# Patient Record
Sex: FEMALE | Race: BLACK OR AFRICAN AMERICAN | NOT HISPANIC OR LATINO
[De-identification: names, ages, dates, MRNs, and addresses within clinical notes are randomized per-mention and may not be internally consistent; named-entity substitution may affect disease eponyms.]

---

## 2017-04-15 ENCOUNTER — APPOINTMENT (OUTPATIENT)
Dept: FAMILY MEDICINE | Facility: CLINIC | Age: 27
End: 2017-04-15

## 2017-08-28 ENCOUNTER — TRANSCRIPTION ENCOUNTER (OUTPATIENT)
Age: 27
End: 2017-08-28

## 2017-09-15 ENCOUNTER — RESULT REVIEW (OUTPATIENT)
Age: 27
End: 2017-09-15

## 2017-10-27 ENCOUNTER — EMERGENCY (EMERGENCY)
Facility: HOSPITAL | Age: 27
LOS: 1 days | Discharge: HOME | End: 2017-10-27
Attending: EMERGENCY MEDICINE | Admitting: EMERGENCY MEDICINE
Payer: COMMERCIAL

## 2017-10-27 ENCOUNTER — TRANSCRIPTION ENCOUNTER (OUTPATIENT)
Age: 27
End: 2017-10-27

## 2017-10-27 VITALS
HEIGHT: 66 IN | RESPIRATION RATE: 20 BRPM | WEIGHT: 235.89 LBS | SYSTOLIC BLOOD PRESSURE: 168 MMHG | DIASTOLIC BLOOD PRESSURE: 91 MMHG | TEMPERATURE: 98 F | OXYGEN SATURATION: 98 % | HEART RATE: 115 BPM

## 2017-10-27 PROCEDURE — 99282 EMERGENCY DEPT VISIT SF MDM: CPT

## 2017-10-27 PROCEDURE — 99283 EMERGENCY DEPT VISIT LOW MDM: CPT

## 2017-10-27 RX ORDER — CEPHALEXIN 500 MG
1 CAPSULE ORAL
Qty: 28 | Refills: 0
Start: 2017-10-27 | End: 2017-11-03

## 2017-10-27 RX ORDER — PHENAZOPYRIDINE HCL 100 MG
1 TABLET ORAL
Qty: 15 | Refills: 0
Start: 2017-10-27 | End: 2017-11-01

## 2017-10-27 NOTE — ED STATDOCS - MEDICAL DECISION MAKING DETAILS
Pt awaiting urine culture results from [facility]. Will start pt on Keflex, pyridium, and d/c for outpatient f/u.

## 2017-10-27 NOTE — ED STATDOCS - NS_ ATTENDINGSCRIBEDETAILS _ED_A_ED_FT
I, Rasheed Cantrell, performed the initial face to face bedside interview with this patient regarding history of present illness, review of symptoms and relevant past medical, social and family history.  I completed an independent physical examination.  I was the initial provider who evaluated this patient. I have signed out the follow up of any pending tests (i.e. labs, radiological studies) to the ACP.  I have communicated the patient’s plan of care and disposition with the ACP.  The history, relevant review of systems, past medical and surgical history, medical decision making, and physical examination was documented by the scribe in my presence and I attest to the accuracy of the documentation.

## 2017-10-27 NOTE — ED STATDOCS - OBJECTIVE STATEMENT
28 yo F presents to ED c/o subjective fever, body aches x 2-3 days and dysuria x 1 day. Pt was evaluated by [facility] today PTA for UTI like symptoms, had UA and culture done, and was sent to the ED to r/o pyelonephritis. Denies receiving [place] [facility] Taking Azo at home. Denies hx of pyelo. Pt had similar UTI in past. Denies recent abx use. Denies abd pain, back pain, nausea, vomiting, hematuria, cough, cold, sore throat. NKDA.

## 2017-10-30 ENCOUNTER — RESULT CHARGE (OUTPATIENT)
Age: 27
End: 2017-10-30

## 2017-10-30 ENCOUNTER — APPOINTMENT (OUTPATIENT)
Dept: FAMILY MEDICINE | Facility: CLINIC | Age: 27
End: 2017-10-30
Payer: MEDICAID

## 2017-10-30 ENCOUNTER — FORM ENCOUNTER (OUTPATIENT)
Age: 27
End: 2017-10-30

## 2017-10-30 ENCOUNTER — LABORATORY RESULT (OUTPATIENT)
Age: 27
End: 2017-10-30

## 2017-10-30 VITALS
BODY MASS INDEX: 37.12 KG/M2 | TEMPERATURE: 98.8 F | HEART RATE: 106 BPM | OXYGEN SATURATION: 98 % | SYSTOLIC BLOOD PRESSURE: 115 MMHG | HEIGHT: 66 IN | WEIGHT: 231 LBS | DIASTOLIC BLOOD PRESSURE: 82 MMHG

## 2017-10-30 LAB
BILIRUB UR QL STRIP: NEGATIVE
GLUCOSE UR-MCNC: NORMAL
HCG UR QL: 2 EU/DL
HCG UR QL: NEGATIVE
HGB UR QL STRIP.AUTO: NORMAL
KETONES UR-MCNC: NEGATIVE
LEUKOCYTE ESTERASE UR QL STRIP: NORMAL
NITRITE UR QL STRIP: POSITIVE
PH UR STRIP: 6.5
PROT UR STRIP-MCNC: NORMAL
QUALITY CONTROL: YES
SP GR UR STRIP: 1.01

## 2017-10-30 PROCEDURE — 99215 OFFICE O/P EST HI 40 MIN: CPT | Mod: 25

## 2017-10-30 PROCEDURE — 81025 URINE PREGNANCY TEST: CPT

## 2017-10-30 PROCEDURE — 36415 COLL VENOUS BLD VENIPUNCTURE: CPT

## 2017-10-30 PROCEDURE — 81003 URINALYSIS AUTO W/O SCOPE: CPT | Mod: QW

## 2017-10-31 ENCOUNTER — OUTPATIENT (OUTPATIENT)
Dept: OUTPATIENT SERVICES | Facility: HOSPITAL | Age: 27
LOS: 1 days | End: 2017-10-31
Payer: COMMERCIAL

## 2017-10-31 ENCOUNTER — APPOINTMENT (OUTPATIENT)
Dept: ULTRASOUND IMAGING | Facility: CLINIC | Age: 27
End: 2017-10-31
Payer: COMMERCIAL

## 2017-10-31 DIAGNOSIS — N39.0 URINARY TRACT INFECTION, SITE NOT SPECIFIED: ICD-10-CM

## 2017-10-31 DIAGNOSIS — R50.9 FEVER, UNSPECIFIED: ICD-10-CM

## 2017-10-31 DIAGNOSIS — R10.2 PELVIC AND PERINEAL PAIN: ICD-10-CM

## 2017-10-31 PROCEDURE — 76770 US EXAM ABDO BACK WALL COMP: CPT | Mod: 26

## 2017-10-31 PROCEDURE — 76830 TRANSVAGINAL US NON-OB: CPT | Mod: 26

## 2017-10-31 PROCEDURE — 76856 US EXAM PELVIC COMPLETE: CPT | Mod: 26

## 2017-10-31 PROCEDURE — 76856 US EXAM PELVIC COMPLETE: CPT

## 2017-10-31 PROCEDURE — 76830 TRANSVAGINAL US NON-OB: CPT

## 2017-10-31 PROCEDURE — 76770 US EXAM ABDO BACK WALL COMP: CPT

## 2017-11-01 LAB
25(OH)D3 SERPL-MCNC: 23.5 NG/ML
ALBUMIN SERPL ELPH-MCNC: 3.9 G/DL
ALP BLD-CCNC: 79 U/L
ALT SERPL-CCNC: 34 U/L
ANION GAP SERPL CALC-SCNC: 13 MMOL/L
APPEARANCE: CLEAR
AST SERPL-CCNC: 26 U/L
BACTERIA UR CULT: NORMAL
BACTERIA: ABNORMAL
BASOPHILS # BLD AUTO: 0 K/UL
BASOPHILS NFR BLD AUTO: 0 %
BILIRUB SERPL-MCNC: 0.2 MG/DL
BILIRUBIN URINE: ABNORMAL
BLOOD URINE: NEGATIVE
BUN SERPL-MCNC: 9 MG/DL
C TRACH RRNA SPEC QL NAA+PROBE: NOT DETECTED
CALCIUM SERPL-MCNC: 8.9 MG/DL
CHLORIDE SERPL-SCNC: 100 MMOL/L
CHOLEST SERPL-MCNC: 205 MG/DL
CHOLEST/HDLC SERPL: 5.9 RATIO
CO2 SERPL-SCNC: 25 MMOL/L
COLOR: ABNORMAL
CREAT SERPL-MCNC: 1 MG/DL
EOSINOPHIL # BLD AUTO: 0.12 K/UL
EOSINOPHIL NFR BLD AUTO: 2.7 %
ERYTHROCYTE [SEDIMENTATION RATE] IN BLOOD BY WESTERGREN METHOD: 37 MM/HR
GLUCOSE QUALITATIVE U: NEGATIVE MG/DL
GLUCOSE SERPL-MCNC: 87 MG/DL
HBA1C MFR BLD HPLC: 5.8 %
HCT VFR BLD CALC: 41.5 %
HDLC SERPL-MCNC: 35 MG/DL
HGB BLD-MCNC: 13.3 G/DL
HIV1+2 AB SPEC QL IA.RAPID: NONREACTIVE
HYALINE CASTS: 0 /LPF
KETONES URINE: NEGATIVE
LDLC SERPL CALC-MCNC: 136 MG/DL
LEUKOCYTE ESTERASE URINE: ABNORMAL
LYMPHOCYTES # BLD AUTO: 1.04 K/UL
LYMPHOCYTES NFR BLD AUTO: 23 %
MAN DIFF?: NORMAL
MCHC RBC-ENTMCNC: 25.1 PG
MCHC RBC-ENTMCNC: 32 GM/DL
MCV RBC AUTO: 78.4 FL
MICROSCOPIC-UA: NORMAL
MONOCYTES # BLD AUTO: 0.6 K/UL
MONOCYTES NFR BLD AUTO: 13.3 %
N GONORRHOEA RRNA SPEC QL NAA+PROBE: NOT DETECTED
NEUTROPHILS # BLD AUTO: 2.75 K/UL
NEUTROPHILS NFR BLD AUTO: 61 %
NITRITE URINE: POSITIVE
PH URINE: 6.5
PLATELET # BLD AUTO: 210 K/UL
POTASSIUM SERPL-SCNC: 4.6 MMOL/L
PROT SERPL-MCNC: 7.7 G/DL
PROTEIN URINE: 30 MG/DL
RBC # BLD: 5.29 M/UL
RBC # FLD: 14.4 %
RED BLOOD CELLS URINE: 4 /HPF
SODIUM SERPL-SCNC: 138 MMOL/L
SOURCE AMPLIFICATION: NORMAL
SPECIFIC GRAVITY URINE: 1.01
SQUAMOUS EPITHELIAL CELLS: 20 /HPF
T4 FREE SERPL-MCNC: 1.4 NG/DL
TRIGL SERPL-MCNC: 168 MG/DL
TSH SERPL-ACNC: 0.85 UIU/ML
URINE COMMENTS: NORMAL
UROBILINOGEN URINE: 1 MG/DL
WBC # FLD AUTO: 4.51 K/UL
WHITE BLOOD CELLS URINE: 15 /HPF

## 2017-11-08 ENCOUNTER — TRANSCRIPTION ENCOUNTER (OUTPATIENT)
Age: 27
End: 2017-11-08

## 2017-11-13 ENCOUNTER — APPOINTMENT (OUTPATIENT)
Dept: FAMILY MEDICINE | Facility: CLINIC | Age: 27
End: 2017-11-13

## 2018-01-26 ENCOUNTER — TRANSCRIPTION ENCOUNTER (OUTPATIENT)
Age: 28
End: 2018-01-26

## 2018-10-16 ENCOUNTER — RESULT REVIEW (OUTPATIENT)
Age: 28
End: 2018-10-16

## 2019-04-10 ENCOUNTER — APPOINTMENT (OUTPATIENT)
Dept: INTERNAL MEDICINE | Facility: CLINIC | Age: 29
End: 2019-04-10

## 2019-06-21 ENCOUNTER — TRANSCRIPTION ENCOUNTER (OUTPATIENT)
Age: 29
End: 2019-06-21

## 2019-08-20 ENCOUNTER — TRANSCRIPTION ENCOUNTER (OUTPATIENT)
Age: 29
End: 2019-08-20

## 2019-08-26 ENCOUNTER — NON-APPOINTMENT (OUTPATIENT)
Age: 29
End: 2019-08-26

## 2019-08-26 ENCOUNTER — APPOINTMENT (OUTPATIENT)
Dept: INTERNAL MEDICINE | Facility: CLINIC | Age: 29
End: 2019-08-26
Payer: COMMERCIAL

## 2019-08-26 VITALS
DIASTOLIC BLOOD PRESSURE: 72 MMHG | TEMPERATURE: 98.3 F | RESPIRATION RATE: 14 BRPM | WEIGHT: 223 LBS | OXYGEN SATURATION: 98 % | SYSTOLIC BLOOD PRESSURE: 112 MMHG | BODY MASS INDEX: 35.84 KG/M2 | HEART RATE: 115 BPM | HEIGHT: 66 IN

## 2019-08-26 DIAGNOSIS — Z87.898 PERSONAL HISTORY OF OTHER SPECIFIED CONDITIONS: ICD-10-CM

## 2019-08-26 DIAGNOSIS — Z00.00 ENCOUNTER FOR GENERAL ADULT MEDICAL EXAMINATION W/OUT ABNORMAL FINDINGS: ICD-10-CM

## 2019-08-26 DIAGNOSIS — J06.9 ACUTE UPPER RESPIRATORY INFECTION, UNSPECIFIED: ICD-10-CM

## 2019-08-26 DIAGNOSIS — F32.9 MAJOR DEPRESSIVE DISORDER, SINGLE EPISODE, UNSPECIFIED: ICD-10-CM

## 2019-08-26 LAB
FLUAV SPEC QL CULT: NORMAL
FLUBV AG SPEC QL IA: NORMAL
HCG UR QL: NEGATIVE
QUALITY CONTROL: YES

## 2019-08-26 PROCEDURE — 93000 ELECTROCARDIOGRAM COMPLETE: CPT

## 2019-08-26 PROCEDURE — 36415 COLL VENOUS BLD VENIPUNCTURE: CPT

## 2019-08-26 PROCEDURE — 81025 URINE PREGNANCY TEST: CPT

## 2019-08-26 PROCEDURE — 96127 BRIEF EMOTIONAL/BEHAV ASSMT: CPT

## 2019-08-26 PROCEDURE — 99215 OFFICE O/P EST HI 40 MIN: CPT | Mod: 25

## 2019-08-26 PROCEDURE — 87804 INFLUENZA ASSAY W/OPTIC: CPT | Mod: QW

## 2019-08-26 RX ORDER — CEPHALEXIN 500 MG/1
500 CAPSULE ORAL
Qty: 28 | Refills: 0 | Status: DISCONTINUED | COMMUNITY
Start: 2017-10-27 | End: 2019-08-26

## 2019-08-26 RX ORDER — LEVOFLOXACIN 500 MG/1
500 TABLET, FILM COATED ORAL DAILY
Qty: 7 | Refills: 0 | Status: DISCONTINUED | COMMUNITY
Start: 2017-10-30 | End: 2019-08-26

## 2019-08-26 RX ORDER — PHENAZOPYRIDINE HYDROCHLORIDE 100 MG/1
100 TABLET ORAL
Qty: 15 | Refills: 0 | Status: DISCONTINUED | COMMUNITY
Start: 2017-10-27 | End: 2019-08-26

## 2019-08-26 NOTE — ASSESSMENT
[FreeTextEntry1] : \par Acute pharyngitis/otitis media:\par -rapid flu test negative today\par -will tx with amoxicillin 875mg PO BID x 10 days\par -I advised rest and fluids\par -note given for work\par -she is to notify office if sx persist or worsen\par \par Depression\par -PHQ 9 score 13\par -Tx options discussed\par -NO SI/HI\par -Will start lexapro 10mg Po daily  (R/B/A/side effects discussed)\par -will refer to [facility]\par -she is to f/u in 3 weeks\par -she is to notify office if sx persist or worsen\par \par Pre-DM/Obesity:\par -will check HgA1c/labs\par \par Migraine headaches:\par -no sx currently\par -uses excedrin prn\par \par Thyroid nodule/thyromegaly:\par -previously seen by Endocrine but did not follow up\par -will check TFTs\par -I previously ordered f/u thyroid US in 2017 butr she did nto go for study-she states she had a lapse in insurance coverage so she did not follow up\par -will order thyroid US\par \par Obesity/Excessive snoring:\par -she was referred to pulmonary for sleep study to r/o VELMA in 2017 but never went\par -she states she still snores excessively and is interested in sleep study-will refer to pulmonary\par \par Dyslipidemia:\par -will check fasting lipids\par \par Vitamin D deficiency:\par -will check vitamin D 25-OH\par \par Tachycardia:\par -EKG today sinus tach at 107, no specific t wave abnormalities\par -tachycardia could be due to current illness vs anxiety vs deconditioning vs thyroid dx\par -she denies chest pain, sob, palpitations\par -will refer to cardiology\par \par HCM:\par \par CPE 2016\par \par EKG 2019\par \par Flu shot: advised-she declined 10/30/2017-advised in the fall\par \par Tdap:  \par \par Hepatitis B vaccine:I advised vaccination previously\par \par HIV testing offered: She consented to testing 2019\par \par HPV vaccine:  pt has refused in past\par \par GYN/PAP:  10/2018\par \par Depression screenin2019 PHQ 9 score 13\par \par F/U 3 weeks.  Labs drawn in office today

## 2019-08-26 NOTE — PHYSICAL EXAM
[No Acute Distress] : no acute distress [Well-Appearing] : well-appearing [Normal Voice/Communication] : normal voice/communication [PERRL] : pupils equal round and reactive to light [Normal Sclera/Conjunctiva] : normal sclera/conjunctiva [Normal Outer Ear/Nose] : the outer ears and nose were normal in appearance [Normal Nasal Mucosa] : the nasal mucosa was normal [No JVD] : no jugular venous distention [No Lymphadenopathy] : no lymphadenopathy [Supple] : supple [No Respiratory Distress] : no respiratory distress  [No Accessory Muscle Use] : no accessory muscle use [Clear to Auscultation] : lungs were clear to auscultation bilaterally [Regular Rhythm] : with a regular rhythm [No Murmur] : no murmur heard [No Carotid Bruits] : no carotid bruits [Normal S1, S2] : normal S1 and S2 [No Extremity Clubbing/Cyanosis] : no extremity clubbing/cyanosis [No Edema] : there was no peripheral edema [Non Tender] : non-tender [Soft] : abdomen soft [Non-distended] : non-distended [No Masses] : no abdominal mass palpated [No HSM] : no HSM [Normal Bowel Sounds] : normal bowel sounds [No Spinal Tenderness] : no spinal tenderness [No Joint Swelling] : no joint swelling [No Rash] : no rash [No Skin Lesions] : no skin lesions [No Focal Deficits] : no focal deficits [Normal Affect] : the affect was normal [Normal Mood] : the mood was normal [Alert and Oriented x3] : oriented to person, place, and time [Normal Insight/Judgement] : insight and judgment were intact [de-identified] : pharynx with mild erythema, no exudate, left TM bulging and erythematous, right TM intacct, B/L ear canals normal [de-identified] : Thyroid appears enlarged B/L, no discreet njodules [de-identified] : tachycardic at 110

## 2019-08-26 NOTE — HEALTH RISK ASSESSMENT
[Patient reported PAP Smear was normal] : Patient reported PAP Smear was normal [No] : In the past 12 months have you used drugs other than those required for medical reasons? No [] : No [[id number]] : 13 [PapSmearDate] : 10/18

## 2019-08-26 NOTE — HISTORY OF PRESENT ILLNESS
[de-identified] : Here today with cold symptoms.  She states she has sore throat, runny nose, chills, low grade temperature since yesterday.  No sick contact.  No recent foreign travel.  She denies cough.  She admits to sneezing. She states she did feel left ear pop and has a little left ear discomfort.  She has not taken anything for her sx.  She states she is feeling worse today\par \par She states a few people at work and her mother noted lump in her neck and she was concerned about her thyroid.  She has hx of thyroid nodule.  She was supposed to repeat thyroid imaging ordered at last visit but she did not go.  She states that the right side of neck feels puffier than the other. She denies difficulty swallowing food\par \par She also reports for last 3 months she has been feeling depressed.  She states she also feels anxious and sometimes has a hard time sleeping.  She admits to sometimes feeling overwhelmed and states sometimes she just has sadness and wants to cry.  She denies SI/HI.  She states her mother has also noticed this and advised her to follow up with her doctor.  She is interested in treatment.  She has never been on medication for anxiety or depression

## 2019-08-26 NOTE — REVIEW OF SYSTEMS
[Recent Change In Weight] : ~T recent weight change [Insomnia] : insomnia [Anxiety] : anxiety [Depression] : depression [Negative] : Neurological [Fever] : no fever [Chills] : no chills [Fatigue] : no fatigue [Chest Pain] : no chest pain [Palpitations] : no palpitations [Lower Ext Edema] : no lower extremity edema [Shortness Of Breath] : no shortness of breath [Wheezing] : no wheezing [Cough] : no cough [Dyspnea on Exertion] : no dyspnea on exertion [Abdominal Pain] : no abdominal pain [Nausea] : no nausea [Diarrhea] : diarrhea [Vomiting] : no vomiting [Suicidal] : not suicidal [Swollen Glands] : no swollen glands [FreeTextEntry2] : lost 8 lbs [FreeTextEntry4] : see HPI [de-identified] : see HPI

## 2019-08-30 ENCOUNTER — RESULT REVIEW (OUTPATIENT)
Age: 29
End: 2019-08-30

## 2019-08-30 LAB
25(OH)D3 SERPL-MCNC: 19.9 NG/ML
ALBUMIN SERPL ELPH-MCNC: 4.2 G/DL
ALP BLD-CCNC: 87 U/L
ALT SERPL-CCNC: 15 U/L
ANION GAP SERPL CALC-SCNC: 12 MMOL/L
APPEARANCE: ABNORMAL
AST SERPL-CCNC: 16 U/L
BACTERIA: ABNORMAL
BASOPHILS # BLD AUTO: 0.06 K/UL
BASOPHILS NFR BLD AUTO: 0.7 %
BILIRUB SERPL-MCNC: 0.3 MG/DL
BILIRUBIN URINE: NEGATIVE
BLOOD URINE: NORMAL
BUN SERPL-MCNC: 8 MG/DL
CALCIUM SERPL-MCNC: 9.4 MG/DL
CHLORIDE SERPL-SCNC: 104 MMOL/L
CHOLEST SERPL-MCNC: 218 MG/DL
CHOLEST/HDLC SERPL: 4.7 RATIO
CO2 SERPL-SCNC: 23 MMOL/L
COLOR: YELLOW
CREAT SERPL-MCNC: 1.06 MG/DL
EOSINOPHIL # BLD AUTO: 0.47 K/UL
EOSINOPHIL NFR BLD AUTO: 5.4 %
ESTIMATED AVERAGE GLUCOSE: 117 MG/DL
GLUCOSE QUALITATIVE U: NEGATIVE
GLUCOSE SERPL-MCNC: 89 MG/DL
HBA1C MFR BLD HPLC: 5.7 %
HCT VFR BLD CALC: 46.5 %
HDLC SERPL-MCNC: 46 MG/DL
HGB BLD-MCNC: 14.1 G/DL
HIV1+2 AB SPEC QL IA.RAPID: NONREACTIVE
HYALINE CASTS: 3 /LPF
IMM GRANULOCYTES NFR BLD AUTO: 0.2 %
KETONES URINE: NEGATIVE
LDLC SERPL CALC-MCNC: 149 MG/DL
LEUKOCYTE ESTERASE URINE: NEGATIVE
LYMPHOCYTES # BLD AUTO: 1.12 K/UL
LYMPHOCYTES NFR BLD AUTO: 13 %
MAN DIFF?: NORMAL
MCHC RBC-ENTMCNC: 25.9 PG
MCHC RBC-ENTMCNC: 30.3 GM/DL
MCV RBC AUTO: 85.3 FL
MICROSCOPIC-UA: NORMAL
MONOCYTES # BLD AUTO: 0.92 K/UL
MONOCYTES NFR BLD AUTO: 10.7 %
NEUTROPHILS # BLD AUTO: 6.04 K/UL
NEUTROPHILS NFR BLD AUTO: 70 %
NITRITE URINE: NEGATIVE
PH URINE: 6.5
PLATELET # BLD AUTO: 243 K/UL
POTASSIUM SERPL-SCNC: 4.5 MMOL/L
PROT SERPL-MCNC: 7.5 G/DL
PROTEIN URINE: ABNORMAL
RBC # BLD: 5.45 M/UL
RBC # FLD: 13.9 %
RED BLOOD CELLS URINE: 2 /HPF
SODIUM SERPL-SCNC: 139 MMOL/L
SPECIFIC GRAVITY URINE: 1.03
SQUAMOUS EPITHELIAL CELLS: >27 /HPF
T4 FREE SERPL-MCNC: 1.2 NG/DL
TRIGL SERPL-MCNC: 113 MG/DL
TSH SERPL-ACNC: 0.74 UIU/ML
UROBILINOGEN URINE: ABNORMAL
WBC # FLD AUTO: 8.63 K/UL
WHITE BLOOD CELLS URINE: 4 /HPF

## 2019-09-09 ENCOUNTER — APPOINTMENT (OUTPATIENT)
Dept: FAMILY MEDICINE | Facility: CLINIC | Age: 29
End: 2019-09-09

## 2019-09-10 ENCOUNTER — FORM ENCOUNTER (OUTPATIENT)
Age: 29
End: 2019-09-10

## 2019-09-11 ENCOUNTER — OUTPATIENT (OUTPATIENT)
Dept: OUTPATIENT SERVICES | Facility: HOSPITAL | Age: 29
LOS: 1 days | End: 2019-09-11
Payer: COMMERCIAL

## 2019-09-11 ENCOUNTER — APPOINTMENT (OUTPATIENT)
Dept: ULTRASOUND IMAGING | Facility: CLINIC | Age: 29
End: 2019-09-11
Payer: COMMERCIAL

## 2019-09-11 DIAGNOSIS — E04.1 NONTOXIC SINGLE THYROID NODULE: ICD-10-CM

## 2019-09-11 PROCEDURE — 76536 US EXAM OF HEAD AND NECK: CPT

## 2019-09-11 PROCEDURE — 76536 US EXAM OF HEAD AND NECK: CPT | Mod: 26

## 2019-09-17 ENCOUNTER — APPOINTMENT (OUTPATIENT)
Dept: INTERNAL MEDICINE | Facility: CLINIC | Age: 29
End: 2019-09-17
Payer: COMMERCIAL

## 2019-09-17 ENCOUNTER — RESULT REVIEW (OUTPATIENT)
Age: 29
End: 2019-09-17

## 2019-09-17 VITALS
TEMPERATURE: 97.7 F | SYSTOLIC BLOOD PRESSURE: 120 MMHG | WEIGHT: 227 LBS | BODY MASS INDEX: 36.48 KG/M2 | DIASTOLIC BLOOD PRESSURE: 84 MMHG | HEART RATE: 90 BPM | RESPIRATION RATE: 14 BRPM | HEIGHT: 66 IN | OXYGEN SATURATION: 97 %

## 2019-09-17 DIAGNOSIS — Z86.39 PERSONAL HISTORY OF OTHER ENDOCRINE, NUTRITIONAL AND METABOLIC DISEASE: ICD-10-CM

## 2019-09-17 DIAGNOSIS — Z86.69 PERSONAL HISTORY OF OTHER DISEASES OF THE NERVOUS SYSTEM AND SENSE ORGANS: ICD-10-CM

## 2019-09-17 DIAGNOSIS — Z87.09 PERSONAL HISTORY OF OTHER DISEASES OF THE RESPIRATORY SYSTEM: ICD-10-CM

## 2019-09-17 DIAGNOSIS — Z87.440 PERSONAL HISTORY OF URINARY (TRACT) INFECTIONS: ICD-10-CM

## 2019-09-17 PROCEDURE — 99213 OFFICE O/P EST LOW 20 MIN: CPT

## 2019-09-17 RX ORDER — AMOXICILLIN 875 MG/1
875 TABLET, FILM COATED ORAL
Qty: 20 | Refills: 0 | Status: DISCONTINUED | COMMUNITY
Start: 2019-08-26 | End: 2019-09-17

## 2019-09-17 NOTE — END OF VISIT
[FreeTextEntry3] : All medical entries made by the Scribe were at my, Dr. Rudy Laws's, direction and personally dictated by me on [9/17/2019]. I have reviewed the chart and agree that the record accurately reflects my personal performance of the history, physical exam, assessment and plan. I have also personally directed, reviewed, and agreed with the chart.\par

## 2019-09-17 NOTE — ASSESSMENT
[FreeTextEntry1] : Depression/anxiety\par -on Lexapro 10 mg Po daily and appears to be doing better-will continue\par -referred  to [facility]-she states she will make appt\par -f/u 3 months\par \par Pre-DM/Obesity:\par -HgA1c 5.7 2019\par -she should adhere to low fat/low cholesterol diet, low carbohydrate diet and weight loss advised\par \par Thyroid nodule/thyromegaly:\par -TFTs normal 2019\par -repeat thyroid US 2019 was normal-no nodules\par \par Obesity/Excessive snoring:\par -she was referred to pulmonary for sleep study to r/o VELMA in  but never went\par -she states she still snores excessively and is interested in sleep study-she has appt with pulmonary in 2019\par \par Dyslipidemia:\par -her cholesterol was 218 2019\par -she should adhere to low fat/low cholesterol diet, low carbohydrate diet and weight loss advised\par \par Vitamin D Deficiency:\par -I advised ergocalciferol 50,000 units once a week for 12 weeks\par -After finishing 12 week course of ergocalciferol, I adv starting over the counter vitamin D 3 1000 units daily\par -I advised repeat vitamin D 25-OH level in 3 months\par \par Tachycardia:\par -normal heart rate today\par -EKG last visit sinus tach at 107, non specific t wave abnormalities\par -she denies chest pain, sob, palpitations\par -she was referred to cardiology-she states she will make appt\par \par Proteinuria:\par -check P:C\par \par HCM:\par \par CPE 2016\par \par EKG 2019\par \par Flu shot: -advised in the fall\par \par Tdap:  \par \par Hepatitis B vaccine:I advised vaccination previously\par \par HIV testin2019 negative\par \par HPV vaccine:  pt has refused in past\par \par GYN/PAP:  10/2018-due 10/2019\par \par Depression screenin2019 PHQ 9 score 13\par \par F/U 3 months

## 2019-09-17 NOTE — ADDENDUM
[FreeTextEntry1] : I, Arabella Parisi, acted solely as a scribe for Dr. Laws on this date [9/17/2019].\par

## 2019-09-17 NOTE — REVIEW OF SYSTEMS
[Negative] : Respiratory [Fever] : no fever [Chills] : no chills [Fatigue] : no fatigue [Chest Pain] : no chest pain [Palpitations] : no palpitations [Lower Ext Edema] : no lower extremity edema [Shortness Of Breath] : no shortness of breath [Wheezing] : no wheezing [Cough] : no cough [Dyspnea on Exertion] : no dyspnea on exertion [Abdominal Pain] : no abdominal pain [Nausea] : no nausea [Diarrhea] : diarrhea [Vomiting] : no vomiting [Suicidal] : not suicidal [Swollen Glands] : no swollen glands [de-identified] : see HPI

## 2019-09-17 NOTE — PHYSICAL EXAM
[Well-Appearing] : well-appearing [No Acute Distress] : no acute distress [Normal Voice/Communication] : normal voice/communication [No JVD] : no jugular venous distention [No Lymphadenopathy] : no lymphadenopathy [Supple] : supple [No Respiratory Distress] : no respiratory distress  [No Accessory Muscle Use] : no accessory muscle use [Clear to Auscultation] : lungs were clear to auscultation bilaterally [Regular Rhythm] : with a regular rhythm [No Murmur] : no murmur heard [Normal S1, S2] : normal S1 and S2 [No Edema] : there was no peripheral edema [No Rash] : no rash [Alert and Oriented x3] : oriented to person, place, and time [Normal Affect] : the affect was normal [Normal Insight/Judgement] : insight and judgment were intact [Normal Mood] : the mood was normal [Normal Oropharynx] : the oropharynx was normal [Normal Rate] : normal rate  [Normal Sclera/Conjunctiva] : normal sclera/conjunctiva [PERRL] : pupils equal round and reactive to light [de-identified] : Thyroid appears enlarged B/L, no discreet nodules

## 2019-09-17 NOTE — HISTORY OF PRESENT ILLNESS
[FreeTextEntry1] : Depression/anxiety follow up [de-identified] : Here for a routine follow up and to go over labs and f/u of depression/anxiety\par \par She states that she is doing a little better on Lexapro 10 mg daily.  She states she feels less anxious. She has not been able to make appt with [facility] yet. She states she will schedule\par \par She has an appt with the pulmonologist sometime in October 2019.\par \par Overall she feels well today with no acute complaints.

## 2019-09-18 LAB
CREAT SPEC-SCNC: 347 MG/DL
CREAT/PROT UR: 0.1 RATIO
PROT UR-MCNC: 18 MG/DL

## 2019-09-20 ENCOUNTER — RESULT REVIEW (OUTPATIENT)
Age: 29
End: 2019-09-20

## 2019-09-24 ENCOUNTER — RECORD ABSTRACTING (OUTPATIENT)
Age: 29
End: 2019-09-24

## 2019-09-24 DIAGNOSIS — Z92.89 PERSONAL HISTORY OF OTHER MEDICAL TREATMENT: ICD-10-CM

## 2019-09-24 DIAGNOSIS — A56.8 SEXUALLY TRANSMITTED CHLAMYDIAL INFECTION OF OTHER SITES: ICD-10-CM

## 2019-09-24 DIAGNOSIS — Z83.3 FAMILY HISTORY OF DIABETES MELLITUS: ICD-10-CM

## 2019-09-24 DIAGNOSIS — Z82.49 FAMILY HISTORY OF ISCHEMIC HEART DISEASE AND OTHER DISEASES OF THE CIRCULATORY SYSTEM: ICD-10-CM

## 2019-09-24 DIAGNOSIS — Z87.448 PERSONAL HISTORY OF OTHER DISEASES OF URINARY SYSTEM: ICD-10-CM

## 2019-09-24 DIAGNOSIS — Z87.42 PERSONAL HISTORY OF OTHER DISEASES OF THE FEMALE GENITAL TRACT: ICD-10-CM

## 2019-09-24 LAB — CYTOLOGY CVX/VAG DOC THIN PREP: NORMAL

## 2019-10-01 ENCOUNTER — APPOINTMENT (OUTPATIENT)
Dept: CARDIOLOGY | Facility: CLINIC | Age: 29
End: 2019-10-01

## 2019-11-07 ENCOUNTER — APPOINTMENT (OUTPATIENT)
Dept: OBGYN | Facility: CLINIC | Age: 29
End: 2019-11-07

## 2019-11-25 ENCOUNTER — RX RENEWAL (OUTPATIENT)
Age: 29
End: 2019-11-25

## 2019-12-16 ENCOUNTER — EMERGENCY (EMERGENCY)
Facility: HOSPITAL | Age: 29
LOS: 1 days | Discharge: HOME | End: 2019-12-16
Attending: EMERGENCY MEDICINE
Payer: SELF-PAY

## 2019-12-16 VITALS
SYSTOLIC BLOOD PRESSURE: 138 MMHG | HEIGHT: 66 IN | WEIGHT: 220.02 LBS | HEART RATE: 92 BPM | OXYGEN SATURATION: 99 % | DIASTOLIC BLOOD PRESSURE: 83 MMHG | RESPIRATION RATE: 20 BRPM | TEMPERATURE: 98 F

## 2019-12-16 LAB — HCG UR QL: NEGATIVE — SIGNIFICANT CHANGE UP

## 2019-12-16 PROCEDURE — 81025 URINE PREGNANCY TEST: CPT

## 2019-12-16 PROCEDURE — 72220 X-RAY EXAM SACRUM TAILBONE: CPT | Mod: 26

## 2019-12-16 PROCEDURE — 72100 X-RAY EXAM L-S SPINE 2/3 VWS: CPT

## 2019-12-16 PROCEDURE — 99283 EMERGENCY DEPT VISIT LOW MDM: CPT

## 2019-12-16 PROCEDURE — 99284 EMERGENCY DEPT VISIT MOD MDM: CPT

## 2019-12-16 PROCEDURE — 72100 X-RAY EXAM L-S SPINE 2/3 VWS: CPT | Mod: 26

## 2019-12-16 PROCEDURE — 72220 X-RAY EXAM SACRUM TAILBONE: CPT

## 2019-12-16 RX ORDER — IBUPROFEN 200 MG
600 TABLET ORAL ONCE
Refills: 0 | Status: COMPLETED | OUTPATIENT
Start: 2019-12-16 | End: 2019-12-16

## 2019-12-16 RX ORDER — CYCLOBENZAPRINE HYDROCHLORIDE 10 MG/1
1 TABLET, FILM COATED ORAL
Qty: 9 | Refills: 0
Start: 2019-12-16 | End: 2019-12-18

## 2019-12-16 RX ADMIN — Medication 600 MILLIGRAM(S): at 09:20

## 2019-12-16 NOTE — ED PROVIDER NOTE - PROGRESS NOTE DETAILS
PA NOTE: No acute findings on imaging. Improvement in sx s/p treatment. Will treat with course of PO flexeril and ibuprofen. Pt advised to follow up with Dr. Buckley if pain persist.

## 2019-12-16 NOTE — ED PROVIDER NOTE - ATTENDING CONTRIBUTION TO CARE
I, Castillo Kerns, performed a face to face bedside interview with this patient regarding history of present illness, review of symptoms and relevant past medical, social and family history.  I completed an independent physical examination. I have communicated the patient’s plan of care and disposition with the ACP.  29 year old female presents following fall down 4 stairs on 12/5. Seen at [facility] at that time for shoulder dislocation, now c/p tailbone pain. No bowel/bladder dysfunction and able to ambulate  Gen: NAD, well appearing  CV: RRR  Pul: CTA b/l  Abd: Soft, non-distended, non-tender  Neuro: no focal deficits  msk: tender to tailbone  Pt improved, stable for dc

## 2019-12-16 NOTE — ED ADULT NURSE NOTE - NSIMPLEMENTINTERV_GEN_ALL_ED
Implemented All Universal Safety Interventions:  [place] to call system. Call bell, personal items and telephone within reach. Instruct patient to call for assistance. Room bathroom lighting operational. Non-slip footwear when patient is off stretcher. Physically safe environment: no spills, clutter or unnecessary equipment. Stretcher in lowest position, wheels locked, appropriate side rails in place.

## 2019-12-16 NOTE — ED PROVIDER NOTE - OBJECTIVE STATEMENT
Pt is a 30 y/o f, no PMH, presents to ED c/o "tailbone" pain s/p fall. Pt states she fell down 4 steps on 12/5/19 and was treated at [facility] for a dislocated shoulder. Pt states since the fall she has been having "tailbone" pain which is worsened with sitting and ambulation. Pt was not evaluated for the tail bone pain during her visit at [place]. Pt has been taking ibuprofen with minimal relief of symptoms. Pt admits to a tingling sensation in her b/l feet since the fall. Denies head strike, LOC, use of blood thinning medication, weakness, HA, dizziness, neck pain, belly pain, chest pain., urinary incontinence / retention.

## 2019-12-16 NOTE — ED PROVIDER NOTE - PATIENT PORTAL LINK FT
You can access the FollowMyHealth Patient Portal offered by [facility] by registering at the following website: [email or web address]. By joining [facility]’s FollowMyHealth portal, you will also be able to view your health information using other applications (apps) compatible with our system.

## 2019-12-16 NOTE — ED ADULT NURSE NOTE - OBJECTIVE STATEMENT
29 year old female, presents to the ED with pain to her coccyx after falling down the stairs on 12/5. Patient was seen at [place] but continues to have pain.   Patient A/Ox4, respirations are even and non labored, lungs clear bilaterally, abdomen is soft and non tender, Full ROM in all extremities. NAD noted.

## 2019-12-16 NOTE — ED PROVIDER NOTE - CARE PROVIDER_API CALL
Rashard Buckley)  Neurosurgery  [facility] [address] [place] [address]  Phone: [phone]  Fax: [phone]  Follow Up Time:

## 2019-12-16 NOTE — ED PROVIDER NOTE - PHYSICAL EXAMINATION
MSK: TTP coccyx bone. No lumbar tenderness. No stepoff. Full ROM and 5/5 str b/l upper and lower extrems

## 2019-12-16 NOTE — ED PROVIDER NOTE - CLINICAL SUMMARY MEDICAL DECISION MAKING FREE TEXT BOX
29f with coccyx pain s/p mech fall on 12/5. Will xray L-spine / coccyx / sacrum to assess for fx. Pain control and reassess.

## 2019-12-16 NOTE — ED ADULT NURSE NOTE - CHPI ED NUR SYMPTOMS NEG
no anorexia/no neck tenderness/no numbness/no constipation/no difficulty bearing weight/no bladder dysfunction/no motor function loss/no tingling/no bowel dysfunction/no fatigue

## 2019-12-17 ENCOUNTER — APPOINTMENT (OUTPATIENT)
Dept: INTERNAL MEDICINE | Facility: CLINIC | Age: 29
End: 2019-12-17

## 2019-12-29 ENCOUNTER — RX RENEWAL (OUTPATIENT)
Age: 29
End: 2019-12-29

## 2019-12-30 ENCOUNTER — RX RENEWAL (OUTPATIENT)
Age: 29
End: 2019-12-30

## 2019-12-31 ENCOUNTER — RX RENEWAL (OUTPATIENT)
Age: 29
End: 2019-12-31

## 2020-01-09 ENCOUNTER — APPOINTMENT (OUTPATIENT)
Dept: INTERNAL MEDICINE | Facility: CLINIC | Age: 30
End: 2020-01-09

## 2020-01-10 ENCOUNTER — APPOINTMENT (OUTPATIENT)
Dept: INTERNAL MEDICINE | Facility: CLINIC | Age: 30
End: 2020-01-10

## 2020-02-12 ENCOUNTER — TRANSCRIPTION ENCOUNTER (OUTPATIENT)
Age: 30
End: 2020-02-12

## 2020-03-20 ENCOUNTER — APPOINTMENT (OUTPATIENT)
Dept: OBGYN | Facility: CLINIC | Age: 30
End: 2020-03-20

## 2020-03-23 ENCOUNTER — RX RENEWAL (OUTPATIENT)
Age: 30
End: 2020-03-23

## 2020-05-07 ENCOUNTER — APPOINTMENT (OUTPATIENT)
Dept: INTERNAL MEDICINE | Facility: CLINIC | Age: 30
End: 2020-05-07

## 2020-05-27 ENCOUNTER — APPOINTMENT (OUTPATIENT)
Dept: INTERNAL MEDICINE | Facility: CLINIC | Age: 30
End: 2020-05-27
Payer: COMMERCIAL

## 2020-05-27 VITALS
TEMPERATURE: 98.9 F | HEART RATE: 108 BPM | WEIGHT: 237 LBS | HEIGHT: 66 IN | OXYGEN SATURATION: 99 % | BODY MASS INDEX: 38.09 KG/M2 | SYSTOLIC BLOOD PRESSURE: 122 MMHG | DIASTOLIC BLOOD PRESSURE: 80 MMHG

## 2020-05-27 DIAGNOSIS — Z87.898 PERSONAL HISTORY OF OTHER SPECIFIED CONDITIONS: ICD-10-CM

## 2020-05-27 DIAGNOSIS — R82.90 UNSPECIFIED ABNORMAL FINDINGS IN URINE: ICD-10-CM

## 2020-05-27 DIAGNOSIS — R10.2 PELVIC AND PERINEAL PAIN: ICD-10-CM

## 2020-05-27 DIAGNOSIS — R80.9 PROTEINURIA, UNSPECIFIED: ICD-10-CM

## 2020-05-27 PROCEDURE — 96127 BRIEF EMOTIONAL/BEHAV ASSMT: CPT

## 2020-05-27 PROCEDURE — 99215 OFFICE O/P EST HI 40 MIN: CPT | Mod: 25

## 2020-05-27 RX ORDER — IBUPROFEN 200 MG/1
200 CAPSULE, LIQUID FILLED ORAL
Qty: 30 | Refills: 0 | Status: DISCONTINUED | COMMUNITY
End: 2020-05-27

## 2020-05-27 RX ORDER — NORETHINDRONE ACETATE AND ETHINYL ESTRADIOL AND FERROUS FUMARATE 1.5-30(21)
1.5-3 KIT ORAL
Qty: 3 | Refills: 0 | Status: DISCONTINUED | COMMUNITY
Start: 2019-10-11 | End: 2020-05-27

## 2020-05-27 RX ORDER — ACETAMINOPHEN, ASPIRIN, AND CAFFEINE 250; 250; 65 MG/1; MG/1; MG/1
250-250-65 TABLET, FILM COATED ORAL
Refills: 0 | Status: DISCONTINUED | COMMUNITY
Start: 2019-08-26 | End: 2020-05-27

## 2020-05-30 PROBLEM — R10.2 SUPRAPUBIC PAIN: Status: RESOLVED | Noted: 2017-10-30 | Resolved: 2020-05-30

## 2020-05-30 PROBLEM — R80.9 PROTEINURIA: Status: RESOLVED | Noted: 2019-09-17 | Resolved: 2020-05-30

## 2020-05-30 PROBLEM — Z87.898 HISTORY OF INSOMNIA: Status: RESOLVED | Noted: 2017-10-30 | Resolved: 2020-05-30

## 2020-05-30 NOTE — HISTORY OF PRESENT ILLNESS
[de-identified] : Here for follow up of depression and anxiety.  She states lexapro had been helping but states it is less effective than it was previously.  She has been under some stress as her significant other has been dx with MS.  She states she has been stress eating and has lost 10lbs.  NO SI/HI\par \par She states she has been getting more migraines now that she has increased stress.  She requests prescription for ibuprofen

## 2020-05-30 NOTE — REVIEW OF SYSTEMS
[Recent Change In Weight] : ~T recent weight change [Depression] : depression [Anxiety] : anxiety [Negative] : ENT [Fever] : no fever [Chills] : no chills [Fatigue] : no fatigue [Chest Pain] : no chest pain [Palpitations] : no palpitations [Lower Ext Edema] : no lower extremity edema [Shortness Of Breath] : no shortness of breath [Wheezing] : no wheezing [Cough] : no cough [Diarrhea] : diarrhea [Nausea] : no nausea [Dyspnea on Exertion] : no dyspnea on exertion [Abdominal Pain] : no abdominal pain [Vomiting] : no vomiting [Suicidal] : not suicidal [de-identified] : see HPI [de-identified] : see HPI

## 2020-06-04 LAB
25(OH)D3 SERPL-MCNC: 59.7 NG/ML
ALBUMIN SERPL ELPH-MCNC: 3.8 G/DL
ALP BLD-CCNC: 80 U/L
ALT SERPL-CCNC: 20 U/L
ANION GAP SERPL CALC-SCNC: 11 MMOL/L
AST SERPL-CCNC: 17 U/L
BILIRUB SERPL-MCNC: 0.2 MG/DL
BUN SERPL-MCNC: 12 MG/DL
CALCIUM SERPL-MCNC: 9.6 MG/DL
CHLORIDE SERPL-SCNC: 104 MMOL/L
CHOLEST SERPL-MCNC: 219 MG/DL
CHOLEST/HDLC SERPL: 4.5 RATIO
CO2 SERPL-SCNC: 23 MMOL/L
CREAT SERPL-MCNC: 1.05 MG/DL
ESTIMATED AVERAGE GLUCOSE: 117 MG/DL
GLUCOSE SERPL-MCNC: 95 MG/DL
HBA1C MFR BLD HPLC: 5.7 %
HDLC SERPL-MCNC: 49 MG/DL
LDLC SERPL CALC-MCNC: 131 MG/DL
POTASSIUM SERPL-SCNC: 4.3 MMOL/L
PROT SERPL-MCNC: 6.7 G/DL
SARS-COV-2 IGG SERPL IA-ACNC: 0.01 INDEX
SARS-COV-2 IGG SERPL QL IA: NEGATIVE
SODIUM SERPL-SCNC: 138 MMOL/L
TRIGL SERPL-MCNC: 192 MG/DL
TSH SERPL-ACNC: 1.42 UIU/ML

## 2020-06-15 ENCOUNTER — RX RENEWAL (OUTPATIENT)
Age: 30
End: 2020-06-15

## 2020-07-03 ENCOUNTER — RX RENEWAL (OUTPATIENT)
Age: 30
End: 2020-07-03

## 2020-07-14 ENCOUNTER — RX RENEWAL (OUTPATIENT)
Age: 30
End: 2020-07-14

## 2020-07-16 ENCOUNTER — APPOINTMENT (OUTPATIENT)
Dept: INTERNAL MEDICINE | Facility: CLINIC | Age: 30
End: 2020-07-16

## 2020-07-17 ENCOUNTER — RX RENEWAL (OUTPATIENT)
Age: 30
End: 2020-07-17

## 2020-07-29 ENCOUNTER — APPOINTMENT (OUTPATIENT)
Dept: OBGYN | Facility: CLINIC | Age: 30
End: 2020-07-29
Payer: COMMERCIAL

## 2020-07-29 VITALS
SYSTOLIC BLOOD PRESSURE: 130 MMHG | BODY MASS INDEX: 38.32 KG/M2 | DIASTOLIC BLOOD PRESSURE: 90 MMHG | WEIGHT: 230 LBS | HEIGHT: 65 IN

## 2020-07-29 PROCEDURE — 99213 OFFICE O/P EST LOW 20 MIN: CPT | Mod: 25

## 2020-07-29 PROCEDURE — 99395 PREV VISIT EST AGE 18-39: CPT

## 2020-07-29 RX ORDER — NORETHINDRONE ACETATE AND ETHINYL ESTRADIOL AND FERROUS FUMARATE 1.5-30(21)
1.5-3 KIT ORAL
Qty: 28 | Refills: 0 | Status: DISCONTINUED | COMMUNITY
Start: 2020-07-17 | End: 2020-07-29

## 2020-07-29 RX ORDER — NORETHINDRONE ACETATE AND ETHINYL ESTRADIOL 1.5-30(21)
1.5-3 KIT ORAL
Qty: 1 | Refills: 0 | Status: DISCONTINUED | COMMUNITY
Start: 2019-12-31 | End: 2020-07-29

## 2020-07-29 NOTE — HISTORY OF PRESENT ILLNESS
[1 Year Ago] : 1 year ago [Last Pap ___] : Last cervical pap smear was [unfilled] [Definite:  ___ (Date)] : the last menstrual period was [unfilled] [Menarche Age: ____] : age at menarche was [unfilled] [Sexually Active] : is sexually active [Contraception] : uses contraception [Oral Contraceptives] : uses oral contraceptives [Male ___] : [unfilled] male

## 2020-07-29 NOTE — DISCUSSION/SUMMARY
[FreeTextEntry1] : BP elevated to 130/90 in office today. Pt has home BP cuff- advised to check BP throughout the day for the next 3 days and report back results.  ( I will call her Friday).  If BP wnl ok for year supply of ocps- if trending high will discuss alternative options and will need to f/u with her pcp.\par \par f/u pap results\par

## 2020-08-07 LAB
CYTOLOGY CVX/VAG DOC THIN PREP: ABNORMAL
HPV HIGH+LOW RISK DNA PNL CVX: NOT DETECTED

## 2020-08-10 ENCOUNTER — RX RENEWAL (OUTPATIENT)
Age: 30
End: 2020-08-10

## 2020-08-31 ENCOUNTER — APPOINTMENT (OUTPATIENT)
Dept: OBGYN | Facility: CLINIC | Age: 30
End: 2020-08-31
Payer: COMMERCIAL

## 2020-08-31 VITALS
WEIGHT: 230 LBS | BODY MASS INDEX: 36.96 KG/M2 | SYSTOLIC BLOOD PRESSURE: 120 MMHG | TEMPERATURE: 98 F | DIASTOLIC BLOOD PRESSURE: 74 MMHG | HEIGHT: 66 IN

## 2020-08-31 PROCEDURE — 81025 URINE PREGNANCY TEST: CPT

## 2020-08-31 PROCEDURE — 99213 OFFICE O/P EST LOW 20 MIN: CPT

## 2020-09-01 LAB
HCG UR QL: POSITIVE
QUALITY CONTROL: YES

## 2020-09-04 LAB
C TRACH RRNA SPEC QL NAA+PROBE: NOT DETECTED
N GONORRHOEA RRNA SPEC QL NAA+PROBE: NOT DETECTED
SOURCE AMPLIFICATION: NORMAL

## 2020-09-16 ENCOUNTER — ASOB RESULT (OUTPATIENT)
Age: 30
End: 2020-09-16

## 2020-09-16 ENCOUNTER — NON-APPOINTMENT (OUTPATIENT)
Age: 30
End: 2020-09-16

## 2020-09-16 ENCOUNTER — APPOINTMENT (OUTPATIENT)
Dept: OBGYN | Facility: CLINIC | Age: 30
End: 2020-09-16
Payer: COMMERCIAL

## 2020-09-16 VITALS
HEIGHT: 66 IN | DIASTOLIC BLOOD PRESSURE: 70 MMHG | BODY MASS INDEX: 39.21 KG/M2 | SYSTOLIC BLOOD PRESSURE: 120 MMHG | WEIGHT: 244 LBS

## 2020-09-16 DIAGNOSIS — Z01.419 ENCOUNTER FOR GYNECOLOGICAL EXAMINATION (GENERAL) (ROUTINE) W/OUT ABNORMAL FINDINGS: ICD-10-CM

## 2020-09-16 DIAGNOSIS — Z30.09 ENCOUNTER FOR OTHER GENERAL COUNSELING AND ADVICE ON CONTRACEPTION: ICD-10-CM

## 2020-09-16 DIAGNOSIS — Z32.01 ENCOUNTER FOR PREGNANCY TEST, RESULT POSITIVE: ICD-10-CM

## 2020-09-16 LAB
BILIRUB UR QL STRIP: NORMAL
GLUCOSE UR-MCNC: NORMAL
HCG UR QL: 1 EU/DL
HGB UR QL STRIP.AUTO: NORMAL
KETONES UR-MCNC: ABNORMAL
LEUKOCYTE ESTERASE UR QL STRIP: NORMAL
NITRITE UR QL STRIP: NORMAL
PH UR STRIP: 6
PROT UR STRIP-MCNC: NORMAL
SP GR UR STRIP: 1.03

## 2020-09-16 PROCEDURE — 90471 IMMUNIZATION ADMIN: CPT

## 2020-09-16 PROCEDURE — 0500F INITIAL PRENATAL CARE VISIT: CPT

## 2020-09-16 PROCEDURE — 99214 OFFICE O/P EST MOD 30 MIN: CPT | Mod: 25

## 2020-09-16 PROCEDURE — 76817 TRANSVAGINAL US OBSTETRIC: CPT

## 2020-09-16 PROCEDURE — 36415 COLL VENOUS BLD VENIPUNCTURE: CPT

## 2020-09-16 PROCEDURE — 81002 URINALYSIS NONAUTO W/O SCOPE: CPT | Mod: NC

## 2020-09-16 PROCEDURE — 90686 IIV4 VACC NO PRSV 0.5 ML IM: CPT

## 2020-09-17 LAB
ABO + RH PNL BLD: NORMAL
BASOPHILS # BLD AUTO: 0.04 K/UL
BASOPHILS NFR BLD AUTO: 0.4 %
BLD GP AB SCN SERPL QL: NORMAL
EOSINOPHIL # BLD AUTO: 0.35 K/UL
EOSINOPHIL NFR BLD AUTO: 3.5 %
ESTIMATED AVERAGE GLUCOSE: 108 MG/DL
HBA1C MFR BLD HPLC: 5.4 %
HBV SURFACE AG SER QL: NONREACTIVE
HCT VFR BLD CALC: 41.4 %
HGB BLD-MCNC: 12.9 G/DL
HIV1+2 AB SPEC QL IA.RAPID: NONREACTIVE
IMM GRANULOCYTES NFR BLD AUTO: 0.3 %
LYMPHOCYTES # BLD AUTO: 2.77 K/UL
LYMPHOCYTES NFR BLD AUTO: 28 %
MAN DIFF?: NORMAL
MCHC RBC-ENTMCNC: 25.6 PG
MCHC RBC-ENTMCNC: 31.2 GM/DL
MCV RBC AUTO: 82.1 FL
MONOCYTES # BLD AUTO: 0.98 K/UL
MONOCYTES NFR BLD AUTO: 9.9 %
NEUTROPHILS # BLD AUTO: 5.73 K/UL
NEUTROPHILS NFR BLD AUTO: 57.9 %
PLATELET # BLD AUTO: 239 K/UL
RBC # BLD: 5.04 M/UL
RBC # FLD: 15.1 %
T PALLIDUM AB SER QL IA: NEGATIVE
TSH SERPL-ACNC: 1.12 UIU/ML
WBC # FLD AUTO: 9.9 K/UL

## 2020-09-18 LAB
B19V IGG SER QL IA: 4 INDEX
B19V IGG+IGM SER-IMP: NORMAL
B19V IGG+IGM SER-IMP: POSITIVE
B19V IGM FLD-ACNC: 0.2
B19V IGM SER-ACNC: NEGATIVE
CMV IGG SERPL QL: 4.6 U/ML
CMV IGG SERPL-IMP: POSITIVE
CMV IGM SERPL QL: 33 AU/ML
CMV IGM SERPL QL: ABNORMAL
MEV IGG FLD QL IA: 219 AU/ML
MEV IGG+IGM SER-IMP: POSITIVE
RUBV IGG FLD-ACNC: 1.4 INDEX
RUBV IGG SER-IMP: POSITIVE
T GONDII AB SER-IMP: NEGATIVE
T GONDII IGG SER QL: <3 IU/ML

## 2020-09-19 NOTE — HISTORY OF PRESENT ILLNESS
[___ Month(s) Ago] : [unfilled] month(s) ago [Good] : being in good health [Regular Exercise] : regular exercise [Healthy Diet] : a healthy diet [Last Pap ___] : Last cervical pap smear was [unfilled] [No Previous Mammogram] : no previous mammogram [No Previous Colonoscopy] : no previous colonoscopy [Reproductive Age] : is of reproductive age [Menstrual Problems] : reports abnormal menses [Menarche Age ____] : age at menarche was [unfilled] [Definite ___ (Date)] : the last menstrual period was [unfilled] [Pregnancy History] : pregnancy history: [Total Preg ___] : [unfilled] [Full Term ___] : [unfilled] [Living ___] : [unfilled] [Abortions ___] : [unfilled] [Definite:  ___ (Date)] : the last menstrual period was [unfilled] [Menarche Age: ____] : age at menarche was [unfilled] [Sexually Active] : is sexually active [Monogamous] : is monogamous [Male ___] : [unfilled] male [No] : no [Weight Concerns] : no concerns with her weight [Contraception] : does not use contraception

## 2020-09-20 LAB
M TB IFN-G BLD-IMP: NEGATIVE
QUANTIFERON TB PLUS MITOGEN MINUS NIL: 6.05 IU/ML
QUANTIFERON TB PLUS NIL: 0.02 IU/ML
QUANTIFERON TB PLUS TB1 MINUS NIL: 0 IU/ML
QUANTIFERON TB PLUS TB2 MINUS NIL: 0 IU/ML

## 2020-10-06 ENCOUNTER — APPOINTMENT (OUTPATIENT)
Dept: OBGYN | Facility: CLINIC | Age: 30
End: 2020-10-06

## 2020-10-07 ENCOUNTER — APPOINTMENT (OUTPATIENT)
Dept: INTERNAL MEDICINE | Facility: CLINIC | Age: 30
End: 2020-10-07

## 2020-10-27 ENCOUNTER — RX RENEWAL (OUTPATIENT)
Age: 30
End: 2020-10-27

## 2020-11-12 ENCOUNTER — APPOINTMENT (OUTPATIENT)
Dept: INTERNAL MEDICINE | Facility: CLINIC | Age: 30
End: 2020-11-12
Payer: COMMERCIAL

## 2020-11-12 DIAGNOSIS — Z3A.09 9 WEEKS GESTATION OF PREGNANCY: ICD-10-CM

## 2020-11-12 PROCEDURE — 99213 OFFICE O/P EST LOW 20 MIN: CPT | Mod: 95

## 2020-11-12 NOTE — ASSESSMENT
[FreeTextEntry1] : \par Depression/anxiety\par -on Lexapro 10 mg Po daily and Buspar 7.5mg PO BID-will refill meds\par -will refer to psychology ([facility])\par -f/u 6 weeks\par \par Pre-DM/Obesity:\par -HgA1c 5.4 2020\par \par Obesity/Excessive snoring:\par -she was previously  referred to pulmonary for sleep study to r/o VELMA\par \par Dyslipidemia:\par -will check fasting labs at next visit\par \par Vitamin D Deficiency:\par -vitamin D 25-OH level was normal 2020\par \par Tachycardia:\par -EKG last visit sinus tach at 107, non specific t wave abnormalities\par -she denies chest pain, sob, palpitations\par -she was referred to cardiology-I again advised her to make appt\par \par Migraines:\par - ibuprofen prn\par \par Allergic rhinitis:\par -will give flonase nasal spray 2 sprays each nostril daily prn\par \par HCM:\par \par CPE 2016\par \par EKG 2019\par \par Flu shot: 2020\par \par Tdap:  2008-will discuss at next visit\par \par Hepatitis B vaccine:I advised vaccination previously\par \par HIV testin2020 negative\par \par HPV vaccine:  pt has refused in past\par \par GYN/PAP:  2020\par \par Depression screenin2020 PHQ 9 score 18\par \par Quantiferon TB test 2020 negative\par \par F/U 6 weeks

## 2020-11-12 NOTE — HISTORY OF PRESENT ILLNESS
[Other Location: e.g. School (Enter Location, City,State)___] : at [unfilled], at the time of the visit. [Medical Office: ([facility])___] : at the medical office located in  [Verbal consent obtained from patient] : the patient, [unfilled] [de-identified] : As this is telemedicine visit no physical exam could be performed.  Diagnosis and treatment based upon symptoms provided\par \par Raina requested telemedicine visit to follow up on her anxiety and depression and to get medication refills\par \par She states when she became pregnant she went off of lexapro and buspar.  She decided to terminate pregnancy and states did have some regrets.  She reports her depression and anxiety returned and she has gone back on medication.  She states she feels it has helped a little.  No SI/HI.  She is open to speaking to counselor.  \par \par She does states her allergies have been acting up and shew has had some nasal congestion with clear discharge.  NO fever/chills reported.  She has been using OTC sudafed

## 2020-11-12 NOTE — REVIEW OF SYSTEMS
[Nasal Discharge] : nasal discharge [Anxiety] : anxiety [Depression] : depression [Negative] : Gastrointestinal [Fever] : no fever [Chills] : no chills [Earache] : no earache [Sore Throat] : no sore throat [Suicidal] : not suicidal [Insomnia] : no insomnia

## 2020-11-12 NOTE — PHYSICAL EXAM
[No Acute Distress] : no acute distress [Well-Appearing] : well-appearing [Normal Voice/Communication] : normal voice/communication [Normal Affect] : the affect was normal [Alert and Oriented x3] : oriented to person, place, and time [Normal Mood] : the mood was normal [Normal Insight/Judgement] : insight and judgment were intact

## 2020-12-14 ENCOUNTER — NON-APPOINTMENT (OUTPATIENT)
Age: 30
End: 2020-12-14

## 2020-12-15 ENCOUNTER — APPOINTMENT (OUTPATIENT)
Dept: INTERNAL MEDICINE | Facility: CLINIC | Age: 30
End: 2020-12-15

## 2021-03-14 ENCOUNTER — RX RENEWAL (OUTPATIENT)
Age: 31
End: 2021-03-14

## 2021-04-02 ENCOUNTER — RX RENEWAL (OUTPATIENT)
Age: 31
End: 2021-04-02

## 2021-04-06 ENCOUNTER — APPOINTMENT (OUTPATIENT)
Dept: INTERNAL MEDICINE | Facility: CLINIC | Age: 31
End: 2021-04-06

## 2021-04-28 ENCOUNTER — APPOINTMENT (OUTPATIENT)
Dept: PULMONOLOGY | Facility: CLINIC | Age: 31
End: 2021-04-28

## 2021-04-28 RX ORDER — DOXYLAMINE SUCCINATE AND PYRIDOXINE HYDROCHLORIDE 10; 10 MG/1; MG/1
10-10 TABLET, DELAYED RELEASE ORAL
Qty: 60 | Refills: 1 | Status: DISCONTINUED | COMMUNITY
Start: 2020-09-16 | End: 2021-04-28

## 2021-04-29 ENCOUNTER — RX RENEWAL (OUTPATIENT)
Age: 31
End: 2021-04-29

## 2021-05-11 ENCOUNTER — LABORATORY RESULT (OUTPATIENT)
Age: 31
End: 2021-05-11

## 2021-05-11 ENCOUNTER — NON-APPOINTMENT (OUTPATIENT)
Age: 31
End: 2021-05-11

## 2021-05-11 ENCOUNTER — APPOINTMENT (OUTPATIENT)
Dept: INTERNAL MEDICINE | Facility: CLINIC | Age: 31
End: 2021-05-11
Payer: COMMERCIAL

## 2021-05-11 VITALS
TEMPERATURE: 97.4 F | RESPIRATION RATE: 15 BRPM | DIASTOLIC BLOOD PRESSURE: 92 MMHG | SYSTOLIC BLOOD PRESSURE: 130 MMHG | BODY MASS INDEX: 40.18 KG/M2 | HEIGHT: 66 IN | HEART RATE: 85 BPM | WEIGHT: 250 LBS | OXYGEN SATURATION: 98 %

## 2021-05-11 PROCEDURE — 90471 IMMUNIZATION ADMIN: CPT

## 2021-05-11 PROCEDURE — 93000 ELECTROCARDIOGRAM COMPLETE: CPT

## 2021-05-11 PROCEDURE — 90715 TDAP VACCINE 7 YRS/> IM: CPT

## 2021-05-11 PROCEDURE — 96127 BRIEF EMOTIONAL/BEHAV ASSMT: CPT

## 2021-05-11 PROCEDURE — 99214 OFFICE O/P EST MOD 30 MIN: CPT | Mod: 25

## 2021-05-11 PROCEDURE — 36415 COLL VENOUS BLD VENIPUNCTURE: CPT

## 2021-05-11 PROCEDURE — 99072 ADDL SUPL MATRL&STAF TM PHE: CPT

## 2021-05-11 RX ORDER — COLD-HOT PACK
125 MCG EACH MISCELLANEOUS
Refills: 0 | Status: ACTIVE | COMMUNITY
Start: 2021-05-11

## 2021-05-11 RX ORDER — ERGOCALCIFEROL 1.25 MG/1
1.25 MG CAPSULE, LIQUID FILLED ORAL
Qty: 12 | Refills: 0 | Status: DISCONTINUED | COMMUNITY
Start: 2019-08-30 | End: 2021-05-11

## 2021-05-11 NOTE — PHYSICAL EXAM
[No Acute Distress] : no acute distress [Well Nourished] : well nourished [Well Developed] : well developed [Well-Appearing] : well-appearing [Normal Voice/Communication] : normal voice/communication [Normal Sclera/Conjunctiva] : normal sclera/conjunctiva [PERRL] : pupils equal round and reactive to light [Normal Oropharynx] : the oropharynx was normal [No JVD] : no jugular venous distention [No Lymphadenopathy] : no lymphadenopathy [Supple] : supple [Thyroid Normal, No Nodules] : the thyroid was normal and there were no nodules present [No Respiratory Distress] : no respiratory distress  [No Accessory Muscle Use] : no accessory muscle use [Clear to Auscultation] : lungs were clear to auscultation bilaterally [Normal Rate] : normal rate  [Regular Rhythm] : with a regular rhythm [Normal S1, S2] : normal S1 and S2 [No Murmur] : no murmur heard [No Edema] : there was no peripheral edema [No Palpable Aorta] : no palpable aorta [No Extremity Clubbing/Cyanosis] : no extremity clubbing/cyanosis [Soft] : abdomen soft [Non Tender] : non-tender [Non-distended] : non-distended [No Masses] : no abdominal mass palpated [No HSM] : no HSM [Normal Bowel Sounds] : normal bowel sounds [Normal Posterior Cervical Nodes] : no posterior cervical lymphadenopathy [Normal Anterior Cervical Nodes] : no anterior cervical lymphadenopathy [No Spinal Tenderness] : no spinal tenderness [No Rash] : no rash [No Focal Deficits] : no focal deficits [Normal Affect] : the affect was normal [Alert and Oriented x3] : oriented to person, place, and time [Normal Mood] : the mood was normal [Normal Insight/Judgement] : insight and judgment were intact [de-identified] : Obese

## 2021-05-11 NOTE — HISTORY OF PRESENT ILLNESS
[de-identified] : Here for follow up\par \par She needs meds refilled\par \par She states she will be moving to [place] next month\par \par She states she feels well overall but states he seasonal allergies have been bad this year.  She states she has been using flonase, claritin, allegra, xyzal and they are not helping.  NO fever/chills reported

## 2021-05-11 NOTE — ASSESSMENT
[FreeTextEntry1] : \par Depression/anxiety\par -on Lexapro 10 mg Po daily and Buspar 7.5mg PO BID-doing well on meds\par -she is seeing therapist\par -will refill meds\par \par Pre-DM/Obesity:\par -HgA1c 5.4 2020\par -she has gained weight and BMI 40\par -she should adhere to low fat/low cholesterol diet, low carbohydrate diet and weight loss advised\par -check fasting labs\par \par Obesity/Excessive snoring:\par -she was previously  referred to pulmonary for sleep study to r/o VELMA-she states she will try and see Pulmonary when she is is down in [place]\par \par Dyslipidemia:\par -will check fasting labs \par \par Vitamin D Deficiency:\par -check vitamin D 25-OH level \par \par Migraines:\par - ibuprofen prn\par \par Allergic rhinitis:\par -she can continue flonase nasal spray  prn and OTC antihistamine such as claritin\par -will add Singulair 10mg PO QHS  (R/B/A/side effects discussed)\par -if her sx persist she may need to see Allergist in [place]\par \par Elevated BP at 130/92\par -she has gained weight sicne last visit\par -BMI now 40\par -EKG today NSR at 78 with non specific t wave abnormalities, poor R wave progression-she is asymptomatic from cardiac standpoint\par -I have advised she see cardiology for evaluation-she will see cardiologist when she gets down to [place]\par \par HCM:\par \par CPE 2016\par \par EKG 2021\par \par Flu shot: 2020\par \par Tdap:  -advised today R/B discussed tdap given 2021 VIS given\par \par Covid vaccine (Pfizer) 3/31/2021 and 2021\par \par Hepatitis B vaccine:I advised vaccination previously\par \par HIV testin2020 negative-offered 2021\par \par HPV vaccine:  pt has refused in past\par \par GYN/PAP:  2020-she will be due 2021 and should f/u with GYN\par \par Depression screenin2021 PHQ 2 score 0\par \par Quantiferon TB test 2020 negative\par \par F/U 3 months with new PMD in [place] for BP check.  Fasting labs drawn in office today

## 2021-05-13 LAB
25(OH)D3 SERPL-MCNC: 64.3 NG/ML
ALBUMIN SERPL ELPH-MCNC: 4.3 G/DL
ALP BLD-CCNC: 110 U/L
ALT SERPL-CCNC: 23 U/L
ANION GAP SERPL CALC-SCNC: 14 MMOL/L
AST SERPL-CCNC: 21 U/L
BASOPHILS # BLD AUTO: 0.05 K/UL
BASOPHILS NFR BLD AUTO: 0.8 %
BILIRUB SERPL-MCNC: 0.2 MG/DL
BUN SERPL-MCNC: 11 MG/DL
CALCIUM SERPL-MCNC: 9.6 MG/DL
CHLORIDE SERPL-SCNC: 105 MMOL/L
CHOLEST SERPL-MCNC: 244 MG/DL
CO2 SERPL-SCNC: 23 MMOL/L
CREAT SERPL-MCNC: 0.93 MG/DL
EOSINOPHIL # BLD AUTO: 0.67 K/UL
EOSINOPHIL NFR BLD AUTO: 10.4 %
ESTIMATED AVERAGE GLUCOSE: 120 MG/DL
GLUCOSE SERPL-MCNC: 102 MG/DL
HBA1C MFR BLD HPLC: 5.8 %
HCT VFR BLD CALC: 46.6 %
HDLC SERPL-MCNC: 48 MG/DL
HGB BLD-MCNC: 14.1 G/DL
IMM GRANULOCYTES NFR BLD AUTO: 0.2 %
LDLC SERPL CALC-MCNC: 161 MG/DL
LYMPHOCYTES # BLD AUTO: 2.57 K/UL
LYMPHOCYTES NFR BLD AUTO: 39.8 %
MAN DIFF?: NORMAL
MCHC RBC-ENTMCNC: 25.6 PG
MCHC RBC-ENTMCNC: 30.3 GM/DL
MCV RBC AUTO: 84.7 FL
MONOCYTES # BLD AUTO: 0.56 K/UL
MONOCYTES NFR BLD AUTO: 8.7 %
NEUTROPHILS # BLD AUTO: 2.6 K/UL
NEUTROPHILS NFR BLD AUTO: 40.1 %
NONHDLC SERPL-MCNC: 196 MG/DL
PLATELET # BLD AUTO: 278 K/UL
POTASSIUM SERPL-SCNC: 4.4 MMOL/L
PROT SERPL-MCNC: 7.3 G/DL
RBC # BLD: 5.5 M/UL
RBC # FLD: 14.5 %
SODIUM SERPL-SCNC: 142 MMOL/L
T4 FREE SERPL-MCNC: 1.3 NG/DL
TRIGL SERPL-MCNC: 172 MG/DL
TSH SERPL-ACNC: 0.88 UIU/ML
WBC # FLD AUTO: 6.46 K/UL

## 2021-05-25 ENCOUNTER — APPOINTMENT (OUTPATIENT)
Dept: OBGYN | Facility: CLINIC | Age: 31
End: 2021-05-25

## 2021-06-04 ENCOUNTER — RX RENEWAL (OUTPATIENT)
Age: 31
End: 2021-06-04

## 2021-07-08 ENCOUNTER — RX RENEWAL (OUTPATIENT)
Age: 31
End: 2021-07-08

## 2021-08-13 ENCOUNTER — RX RENEWAL (OUTPATIENT)
Age: 31
End: 2021-08-13

## 2022-04-04 NOTE — ED ADULT TRIAGE NOTE - LOCATION:
Triage: Pt arrives ambulatory from home with CC of epigastric pain. She reports the pain is so severe it causes her SOB. She has had this pain on and off for about 9 months. Seen previously for this pain and told it was anxiety.  Reports the pain is more severe than usual. Right arm;

## 2022-07-21 ENCOUNTER — APPOINTMENT (OUTPATIENT)
Dept: INTERNAL MEDICINE | Facility: CLINIC | Age: 32
End: 2022-07-21

## 2022-08-02 ENCOUNTER — APPOINTMENT (OUTPATIENT)
Dept: OBGYN | Facility: CLINIC | Age: 32
End: 2022-08-02

## 2022-09-04 ENCOUNTER — APPOINTMENT (OUTPATIENT)
Dept: AFTER HOURS CARE | Facility: EMERGENCY ROOM | Age: 32
End: 2022-09-04

## 2022-09-04 PROCEDURE — 99204 OFFICE O/P NEW MOD 45 MIN: CPT | Mod: 95

## 2022-09-04 NOTE — HISTORY OF PRESENT ILLNESS
[Home] : at home, [unfilled] , at the time of the visit. [Other Location: e.g. Home (Enter Location, City,State)___] : at [unfilled] [Verbal consent obtained from patient] : the patient, [unfilled] [FreeTextEntry8] : 32y F hx depression/anxiety, obesity, preDM, hld now p/w 3d urinary urgency, minimal dysuria assoc today w/back & L flank pain similar to episode of pyelo she was hospitalized for in 2017. OTC home UTI test was rapidly positive. NKDA. LMP 8/21 - 2wk ago. No hematuria, fever, chills, vag bleed or vag dc.

## 2022-09-04 NOTE — ASSESSMENT
[FreeTextEntry1] : concerning for early pyelo in this young afebrile pt with multp comorbidities and a prior episode of pyelo .

## 2022-09-04 NOTE — PLAN
[With new medications prescribed] : Treat in place: with new medications prescribed [FreeTextEntry1] : rx TMP/SMX x2wk\par UA, UCH UCx ordered - possible delay with upcoming labor day\par STRICT precautions and anticipatory guidance\par po hydration and OTC fever meds\par pmd follow up

## 2022-09-07 ENCOUNTER — APPOINTMENT (OUTPATIENT)
Dept: INTERNAL MEDICINE | Facility: CLINIC | Age: 32
End: 2022-09-07

## 2022-09-16 ENCOUNTER — APPOINTMENT (OUTPATIENT)
Dept: INTERNAL MEDICINE | Facility: CLINIC | Age: 32
End: 2022-09-16

## 2022-09-16 VITALS
WEIGHT: 240 LBS | HEART RATE: 82 BPM | HEIGHT: 66 IN | SYSTOLIC BLOOD PRESSURE: 122 MMHG | BODY MASS INDEX: 38.57 KG/M2 | OXYGEN SATURATION: 97 % | DIASTOLIC BLOOD PRESSURE: 90 MMHG | TEMPERATURE: 98.1 F | RESPIRATION RATE: 15 BRPM

## 2022-09-16 LAB
BILIRUB UR QL STRIP: NORMAL
CLARITY UR: CLEAR
COLLECTION METHOD: NORMAL
GLUCOSE UR-MCNC: NEGATIVE
HCG UR QL: 1 EU/DL
HGB UR QL STRIP.AUTO: NEGATIVE
KETONES UR-MCNC: NEGATIVE
LEUKOCYTE ESTERASE UR QL STRIP: NEGATIVE
PH UR STRIP: 7
PROT UR STRIP-MCNC: NEGATIVE
SP GR UR STRIP: 1.02

## 2022-09-16 PROCEDURE — G0442 ANNUAL ALCOHOL SCREEN 15 MIN: CPT

## 2022-09-16 PROCEDURE — 99214 OFFICE O/P EST MOD 30 MIN: CPT | Mod: 25

## 2022-09-16 PROCEDURE — G0444 DEPRESSION SCREEN ANNUAL: CPT | Mod: 59

## 2022-09-16 PROCEDURE — 81003 URINALYSIS AUTO W/O SCOPE: CPT | Mod: QW

## 2022-09-19 ENCOUNTER — RESULT REVIEW (OUTPATIENT)
Age: 32
End: 2022-09-19

## 2022-09-19 RX ORDER — ESCITALOPRAM OXALATE 10 MG/1
10 TABLET ORAL
Qty: 90 | Refills: 0 | Status: DISCONTINUED | COMMUNITY
Start: 2020-10-28 | End: 2022-09-19

## 2022-09-19 RX ORDER — MONTELUKAST 10 MG/1
10 TABLET, FILM COATED ORAL
Qty: 30 | Refills: 0 | Status: DISCONTINUED | COMMUNITY
Start: 2021-05-11 | End: 2022-09-19

## 2022-09-19 RX ORDER — BUSPIRONE HYDROCHLORIDE 7.5 MG/1
7.5 TABLET ORAL
Qty: 180 | Refills: 0 | Status: DISCONTINUED | COMMUNITY
Start: 2020-10-28 | End: 2022-09-19

## 2022-09-19 NOTE — REVIEW OF SYSTEMS
[Abdominal Pain] : abdominal pain [Negative] : Genitourinary [Nausea] : no nausea [Constipation] : no constipation [Diarrhea] : diarrhea [Vomiting] : no vomiting [Heartburn] : no heartburn [Melena] : no melena

## 2022-09-19 NOTE — COUNSELING
[Potential consequences of obesity discussed] : Potential consequences of obesity discussed [Benefits of weight loss discussed] : Benefits of weight loss discussed [Encouraged to maintain food diary] : Encouraged to maintain food diary [Encouraged to increase physical activity] : Encouraged to increase physical activity [Encouraged to use exercise tracking device] : Encouraged to use exercise tracking device [Decrease Portions] : decrease portions [____ min/wk Activity] : [unfilled] min/wk activity [Keep Food Diary] : keep food diary

## 2022-09-19 NOTE — HISTORY OF PRESENT ILLNESS
[FreeTextEntry8] : Ms. GOLD WEST  is    32 year female . \par She is a pt. of Dr Laws.\par Patient presents today stating that she had pain and burning during urination and left mid quadrant pain 2 weeks ago she saw a doctor through telehealth and was prescribed Bactrim DS for 2 weeks.  She have a history of pyelonephritis in 2017.\par She had 2 more days of antibiotic left patient stated her urinary symptoms are resolved but that she continues to have pain in her left mid quadrant.  No fever no nausea and vomiting.\par h/o anxiety and depression she is currently on sertraline 50 mg 3 tablets once a day.  She is feeling stable with medication asking for refill.\par \par

## 2022-09-19 NOTE — ASSESSMENT
[FreeTextEntry1] : Patient presents with complaint of left lower quadrant pain and UTI:\par She is currently on Bactrim DS her UTI symptoms are resolved.  Continues to have lower abdominal pain in the left side.  Order CT abdomen and pelvis.\par Advised to finish antibiotic and increase water intake.\par Motrin as needed for pain.\par \par Depression and anxiety:\par Sees psychiatrist.  Currently on sertraline 50 mg 3 tablets once a day.\par Her symptoms are controlled and stable with medication.  Renewed sertraline prescription.\par \par obesity:\par BMI: 38\par Advised low carb , Mediterranean diet,avoid carbonated beverage , added sugar and sweets.\par exercise min 150 min/wk.\par portion control, maintain a food diary.\par drink plenty of water.\par \par \par HM:\par SBIRT 0, PHQ 2 :0 .\par Declined flu shot.\par Received COVID-vaccine.\par \par She will follow-up with Dr. Laws for CPE.

## 2022-09-19 NOTE — HEALTH RISK ASSESSMENT
[Never] : Never [Yes] : Yes [Monthly or less (1 pt)] : Monthly or less (1 point) [1 or 2 (0 pts)] : 1 or 2 (0 points) [0] : 2) Feeling down, depressed, or hopeless: Not at all (0) [PHQ-2 Negative - No further assessment needed] : PHQ-2 Negative - No further assessment needed [Audit-CScore] : 1 [MHF3Oatwj] : 0

## 2022-09-19 NOTE — PHYSICAL EXAM
[Normal] : normal rate, regular rhythm, normal S1 and S2 and no murmur heard [Soft] : abdomen soft [Non-distended] : non-distended [Normal Bowel Sounds] : normal bowel sounds [de-identified] : +mild pain in lf.lower quadrant on deep palpation.

## 2022-09-28 ENCOUNTER — APPOINTMENT (OUTPATIENT)
Dept: CT IMAGING | Facility: CLINIC | Age: 32
End: 2022-09-28

## 2022-09-28 ENCOUNTER — OUTPATIENT (OUTPATIENT)
Dept: OUTPATIENT SERVICES | Facility: HOSPITAL | Age: 32
LOS: 1 days | End: 2022-09-28
Payer: COMMERCIAL

## 2022-09-28 DIAGNOSIS — R10.30 LOWER ABDOMINAL PAIN, UNSPECIFIED: ICD-10-CM

## 2022-09-28 PROCEDURE — 74177 CT ABD & PELVIS W/CONTRAST: CPT | Mod: 26

## 2022-09-28 PROCEDURE — 74177 CT ABD & PELVIS W/CONTRAST: CPT

## 2022-09-29 ENCOUNTER — TRANSCRIPTION ENCOUNTER (OUTPATIENT)
Age: 32
End: 2022-09-29

## 2022-10-26 RX ORDER — SERTRALINE HYDROCHLORIDE 50 MG/1
50 TABLET, FILM COATED ORAL
Qty: 270 | Refills: 0 | Status: ACTIVE | COMMUNITY
Start: 2022-07-21 | End: 1900-01-01

## 2022-10-27 ENCOUNTER — NON-APPOINTMENT (OUTPATIENT)
Age: 32
End: 2022-10-27

## 2022-11-17 ENCOUNTER — APPOINTMENT (OUTPATIENT)
Dept: INTERNAL MEDICINE | Facility: CLINIC | Age: 32
End: 2022-11-17

## 2022-12-02 ENCOUNTER — NON-APPOINTMENT (OUTPATIENT)
Age: 32
End: 2022-12-02

## 2022-12-02 ENCOUNTER — APPOINTMENT (OUTPATIENT)
Dept: OBGYN | Facility: CLINIC | Age: 32
End: 2022-12-02
Payer: COMMERCIAL

## 2022-12-02 VITALS
DIASTOLIC BLOOD PRESSURE: 70 MMHG | BODY MASS INDEX: 40.98 KG/M2 | WEIGHT: 246 LBS | SYSTOLIC BLOOD PRESSURE: 122 MMHG | HEIGHT: 65 IN

## 2022-12-02 DIAGNOSIS — Z01.419 ENCOUNTER FOR GYNECOLOGICAL EXAMINATION (GENERAL) (ROUTINE) W/OUT ABNORMAL FINDINGS: ICD-10-CM

## 2022-12-02 LAB
HCG UR QL: POSITIVE
QUALITY CONTROL: YES

## 2022-12-02 PROCEDURE — 36415 COLL VENOUS BLD VENIPUNCTURE: CPT

## 2022-12-02 PROCEDURE — 81025 URINE PREGNANCY TEST: CPT

## 2022-12-02 PROCEDURE — 99395 PREV VISIT EST AGE 18-39: CPT

## 2022-12-02 RX ORDER — FLUTICASONE PROPIONATE 50 UG/1
50 SPRAY, METERED NASAL DAILY
Qty: 1 | Refills: 2 | Status: DISCONTINUED | COMMUNITY
Start: 2020-11-12 | End: 2022-12-02

## 2022-12-02 RX ORDER — SULFAMETHOXAZOLE AND TRIMETHOPRIM 800; 160 MG/1; MG/1
800-160 TABLET ORAL TWICE DAILY
Qty: 28 | Refills: 0 | Status: DISCONTINUED | COMMUNITY
Start: 2022-09-04 | End: 2022-12-02

## 2022-12-02 NOTE — PHYSICAL EXAM
[Chaperone Present] : A chaperone was present in the examining room during all aspects of the physical examination [FreeTextEntry1] : Annmarie MALDONADO  [Appropriately responsive] : appropriately responsive [Alert] : alert [No Acute Distress] : no acute distress [Oriented x3] : oriented x3 [Examination Of The Breasts] : a normal appearance [No Masses] : no breast masses were palpable [Labia Majora] : normal [Labia Minora] : normal [No Bleeding] : There was no active vaginal bleeding [Normal] : normal [Uterine Adnexae] : normal

## 2022-12-02 NOTE — HISTORY OF PRESENT ILLNESS
[N] : Patient does not use contraception [Y] : Patient is sexually active [Currently Active] : currently active [Men] : men [Vaginal] : vaginal [No] : No [TextBox_4] : Annual  Pregnant took test 2 weeks ago [PapSmeardate] : 7/29/20 [TextBox_31] : BV HPV NEGATIVE [LMPDate] : 10/20/22 [PGHxTotal] : 2 [[facility] CenterxFulerm] : 1 [[facility] Centeriving] : 1 [FreeTextEntry1] : 10/20/22

## 2022-12-02 NOTE — DISCUSSION/SUMMARY
[FreeTextEntry1] : Pap and STD testing completed today, will f/u results\par \par Positive urine pregnancy test in office\par \par Encouraged to continue PNV, reviewed precautions in early pregnancy and warning signs for bleeding/pain reviewed. Pt to schedule new OB appt.

## 2022-12-05 LAB
C TRACH RRNA SPEC QL NAA+PROBE: NOT DETECTED
HAV IGM SER QL: NONREACTIVE
HBV CORE IGM SER QL: NONREACTIVE
HBV SURFACE AG SER QL: NONREACTIVE
HCV AB SER QL: NONREACTIVE
HCV S/CO RATIO: 0.09 S/CO
HIV1+2 AB SPEC QL IA.RAPID: NONREACTIVE
HPV HIGH+LOW RISK DNA PNL CVX: NOT DETECTED
N GONORRHOEA RRNA SPEC QL NAA+PROBE: NOT DETECTED
SOURCE TP AMPLIFICATION: NORMAL
T PALLIDUM AB SER QL IA: NEGATIVE

## 2022-12-13 ENCOUNTER — TRANSCRIPTION ENCOUNTER (OUTPATIENT)
Age: 32
End: 2022-12-13

## 2022-12-13 LAB — CYTOLOGY CVX/VAG DOC THIN PREP: ABNORMAL

## 2022-12-15 ENCOUNTER — ASOB RESULT (OUTPATIENT)
Age: 32
End: 2022-12-15

## 2022-12-15 ENCOUNTER — APPOINTMENT (OUTPATIENT)
Dept: OBGYN | Facility: CLINIC | Age: 32
End: 2022-12-15

## 2022-12-15 ENCOUNTER — APPOINTMENT (OUTPATIENT)
Dept: ANTEPARTUM | Facility: CLINIC | Age: 32
End: 2022-12-15
Payer: COMMERCIAL

## 2022-12-15 VITALS
SYSTOLIC BLOOD PRESSURE: 116 MMHG | HEIGHT: 65 IN | DIASTOLIC BLOOD PRESSURE: 68 MMHG | BODY MASS INDEX: 42.28 KG/M2 | WEIGHT: 253.8 LBS

## 2022-12-15 LAB
BILIRUB UR QL STRIP: NORMAL
GLUCOSE UR-MCNC: NORMAL
HCG UR QL: 0.2 EU/DL
HGB UR QL STRIP.AUTO: NORMAL
KETONES UR-MCNC: ABNORMAL
LEUKOCYTE ESTERASE UR QL STRIP: NORMAL
NITRITE UR QL STRIP: NORMAL
PH UR STRIP: 5.5
PROT UR STRIP-MCNC: NORMAL
SP GR UR STRIP: 1.02

## 2022-12-15 PROCEDURE — 36415 COLL VENOUS BLD VENIPUNCTURE: CPT

## 2022-12-15 PROCEDURE — 0500F INITIAL PRENATAL CARE VISIT: CPT

## 2022-12-15 PROCEDURE — 76817 TRANSVAGINAL US OBSTETRIC: CPT

## 2022-12-16 LAB
ABO + RH PNL BLD: NORMAL
BASOPHILS # BLD AUTO: 0.05 K/UL
BASOPHILS NFR BLD AUTO: 0.5 %
BLD GP AB SCN SERPL QL: NORMAL
EOSINOPHIL # BLD AUTO: 0.44 K/UL
EOSINOPHIL NFR BLD AUTO: 4.6 %
ESTIMATED AVERAGE GLUCOSE: 114 MG/DL
HBA1C MFR BLD HPLC: 5.6 %
HBV SURFACE AG SER QL: NONREACTIVE
HCT VFR BLD CALC: 41.1 %
HGB A MFR BLD: 97.8 %
HGB A2 MFR BLD: 2.2 %
HGB BLD-MCNC: 12.9 G/DL
HGB FRACT BLD-IMP: NORMAL
IMM GRANULOCYTES NFR BLD AUTO: 0.3 %
LYMPHOCYTES # BLD AUTO: 2.67 K/UL
LYMPHOCYTES NFR BLD AUTO: 28 %
MAN DIFF?: NORMAL
MCHC RBC-ENTMCNC: 25.6 PG
MCHC RBC-ENTMCNC: 31.4 GM/DL
MCV RBC AUTO: 81.5 FL
MONOCYTES # BLD AUTO: 0.88 K/UL
MONOCYTES NFR BLD AUTO: 9.2 %
NEUTROPHILS # BLD AUTO: 5.47 K/UL
NEUTROPHILS NFR BLD AUTO: 57.4 %
PLATELET # BLD AUTO: 249 K/UL
RBC # BLD: 5.04 M/UL
RBC # FLD: 14.8 %
TSH SERPL-ACNC: 1.14 UIU/ML
WBC # FLD AUTO: 9.54 K/UL

## 2022-12-20 LAB
BACTERIA UR CULT: NORMAL
MEV IGG FLD QL IA: 113 AU/ML
MEV IGG+IGM SER-IMP: POSITIVE
RUBV IGG FLD-ACNC: 1.1 INDEX
RUBV IGG SER-IMP: POSITIVE
VZV AB TITR SER: POSITIVE
VZV IGG SER IF-ACNC: 1243 INDEX

## 2022-12-22 LAB — FMR1 GENE MUT ANL BLD/T: NORMAL

## 2022-12-26 LAB — AR GENE MUT ANL BLD/T: NORMAL

## 2022-12-27 ENCOUNTER — NON-APPOINTMENT (OUTPATIENT)
Age: 32
End: 2022-12-27

## 2022-12-28 ENCOUNTER — APPOINTMENT (OUTPATIENT)
Dept: ANTEPARTUM | Facility: CLINIC | Age: 32
End: 2022-12-28

## 2022-12-28 ENCOUNTER — APPOINTMENT (OUTPATIENT)
Dept: OBGYN | Facility: CLINIC | Age: 32
End: 2022-12-28

## 2022-12-29 LAB — CFTR MUT TESTED BLD/T: NEGATIVE

## 2023-01-06 ENCOUNTER — APPOINTMENT (OUTPATIENT)
Dept: INTERNAL MEDICINE | Facility: CLINIC | Age: 33
End: 2023-01-06

## 2023-01-10 ENCOUNTER — NON-APPOINTMENT (OUTPATIENT)
Age: 33
End: 2023-01-10

## 2023-01-10 ENCOUNTER — APPOINTMENT (OUTPATIENT)
Dept: OBGYN | Facility: CLINIC | Age: 33
End: 2023-01-10
Payer: COMMERCIAL

## 2023-01-10 VITALS
SYSTOLIC BLOOD PRESSURE: 122 MMHG | DIASTOLIC BLOOD PRESSURE: 76 MMHG | HEIGHT: 65 IN | BODY MASS INDEX: 41.48 KG/M2 | WEIGHT: 249 LBS

## 2023-01-10 LAB
BILIRUB UR QL STRIP: NORMAL
GLUCOSE UR-MCNC: NORMAL
HCG UR QL: 0.2 EU/DL
HGB UR QL STRIP.AUTO: NORMAL
KETONES UR-MCNC: 15
LEUKOCYTE ESTERASE UR QL STRIP: ABNORMAL
NITRITE UR QL STRIP: NORMAL
PH UR STRIP: 5.5
PROT UR STRIP-MCNC: 30
SP GR UR STRIP: 1.02

## 2023-01-10 PROCEDURE — 0502F SUBSEQUENT PRENATAL CARE: CPT

## 2023-01-10 PROCEDURE — 36415 COLL VENOUS BLD VENIPUNCTURE: CPT

## 2023-01-15 LAB
ALL CHROMOSOMES INTERPRETATION: NORMAL
ALL CHROMOSOMES TEST RESULT: NORMAL
CHROMOSOME13 INTERPRETATION: NORMAL
CHROMOSOME13 TEST RESULT: NORMAL
CHROMOSOME18 INTERPRETATION: NORMAL
CHROMOSOME18 TEST RESULT: NORMAL
CHROMOSOME21 INTERPRETATION: NORMAL
CHROMOSOME21 TEST RESULT: NORMAL
FETAL FRACTION: NORMAL
MICRODELETIONS INTERPRETATION: NORMAL
MICRODELETIONS RESULT: NORMAL
PERFORMANCE AND LIMITATIONS: NORMAL
SEX CHROMOSOME INTERPRETATION: NORMAL
SEX CHROMOSOME TEST RESULT: NORMAL
VERIFI WITH MICRODELETIONS: NOT DETECTED

## 2023-01-24 ENCOUNTER — ASOB RESULT (OUTPATIENT)
Age: 33
End: 2023-01-24

## 2023-01-24 ENCOUNTER — APPOINTMENT (OUTPATIENT)
Dept: ANTEPARTUM | Facility: CLINIC | Age: 33
End: 2023-01-24
Payer: COMMERCIAL

## 2023-01-24 ENCOUNTER — NON-APPOINTMENT (OUTPATIENT)
Age: 33
End: 2023-01-24

## 2023-01-24 PROCEDURE — 76813 OB US NUCHAL MEAS 1 GEST: CPT

## 2023-02-16 ENCOUNTER — NON-APPOINTMENT (OUTPATIENT)
Age: 33
End: 2023-02-16

## 2023-02-21 ENCOUNTER — APPOINTMENT (OUTPATIENT)
Dept: OBGYN | Facility: CLINIC | Age: 33
End: 2023-02-21
Payer: COMMERCIAL

## 2023-02-21 LAB
BILIRUB UR QL STRIP: NORMAL
GLUCOSE UR-MCNC: NORMAL
HCG UR QL: 0.2 EU/DL
HGB UR QL STRIP.AUTO: NORMAL
KETONES UR-MCNC: NORMAL
LEUKOCYTE ESTERASE UR QL STRIP: NORMAL
NITRITE UR QL STRIP: NORMAL
PH UR STRIP: 6
PROT UR STRIP-MCNC: NORMAL
SP GR UR STRIP: 1.03

## 2023-02-21 PROCEDURE — 0502F SUBSEQUENT PRENATAL CARE: CPT

## 2023-02-21 PROCEDURE — 36415 COLL VENOUS BLD VENIPUNCTURE: CPT

## 2023-02-23 LAB
ALBUMIN SERPL ELPH-MCNC: 3.7 G/DL
ALP BLD-CCNC: 81 U/L
ALT SERPL-CCNC: 19 U/L
ANION GAP SERPL CALC-SCNC: 14 MMOL/L
AST SERPL-CCNC: 19 U/L
BASOPHILS # BLD AUTO: 0.03 K/UL
BASOPHILS NFR BLD AUTO: 0.4 %
BILIRUB SERPL-MCNC: <0.2 MG/DL
BUN SERPL-MCNC: 7 MG/DL
CALCIUM SERPL-MCNC: 9.4 MG/DL
CHLORIDE SERPL-SCNC: 101 MMOL/L
CO2 SERPL-SCNC: 22 MMOL/L
CREAT SERPL-MCNC: 0.75 MG/DL
CREAT SPEC-SCNC: 223 MG/DL
CREAT/PROT UR: 0.1 RATIO
EGFR: 108 ML/MIN/1.73M2
EOSINOPHIL # BLD AUTO: 0.32 K/UL
EOSINOPHIL NFR BLD AUTO: 3.8 %
GLUCOSE 1H P 50 G GLC PO SERPL-MCNC: 82 MG/DL
GLUCOSE SERPL-MCNC: 74 MG/DL
HCT VFR BLD CALC: 37.6 %
HGB BLD-MCNC: 12 G/DL
IMM GRANULOCYTES NFR BLD AUTO: 0.4 %
LYMPHOCYTES # BLD AUTO: 2.07 K/UL
LYMPHOCYTES NFR BLD AUTO: 24.6 %
MAN DIFF?: NORMAL
MCHC RBC-ENTMCNC: 25 PG
MCHC RBC-ENTMCNC: 31.9 GM/DL
MCV RBC AUTO: 78.3 FL
MONOCYTES # BLD AUTO: 0.72 K/UL
MONOCYTES NFR BLD AUTO: 8.6 %
NEUTROPHILS # BLD AUTO: 5.25 K/UL
NEUTROPHILS NFR BLD AUTO: 62.2 %
PLATELET # BLD AUTO: 210 K/UL
POTASSIUM SERPL-SCNC: 4.4 MMOL/L
PROT SERPL-MCNC: 6.6 G/DL
PROT UR-MCNC: 16 MG/DL
RBC # BLD: 4.8 M/UL
RBC # FLD: 14.3 %
SODIUM SERPL-SCNC: 137 MMOL/L
WBC # FLD AUTO: 8.42 K/UL

## 2023-02-27 LAB
AFP MOM: 1.24
AFP VALUE: 43.3 NG/ML
ALPHA FETOPROTEIN SERUM COMMENT: NORMAL
ALPHA FETOPROTEIN SERUM INTERPRETATION: NORMAL
ALPHA FETOPROTEIN SERUM RESULTS: NORMAL
ALPHA FETOPROTEIN SERUM TEST RESULTS: NORMAL
GESTATIONAL AGE BASED ON: NORMAL
GESTATIONAL AGE ON COLLECTION DATE: 17.6 WEEKS
INSULIN DEP DIABETES: NO
MATERNAL AGE AT EDD AFP: 33.2 YR
MULTIPLE GESTATION: NO
OSBR RISK 1 IN: NORMAL
RACE: NORMAL
WEIGHT AFP: 251 LBS

## 2023-03-09 ENCOUNTER — APPOINTMENT (OUTPATIENT)
Dept: MATERNAL FETAL MEDICINE | Facility: CLINIC | Age: 33
End: 2023-03-09
Payer: COMMERCIAL

## 2023-03-09 ENCOUNTER — APPOINTMENT (OUTPATIENT)
Dept: ANTEPARTUM | Facility: CLINIC | Age: 33
End: 2023-03-09
Payer: COMMERCIAL

## 2023-03-09 ENCOUNTER — NON-APPOINTMENT (OUTPATIENT)
Age: 33
End: 2023-03-09

## 2023-03-09 ENCOUNTER — ASOB RESULT (OUTPATIENT)
Age: 33
End: 2023-03-09

## 2023-03-09 VITALS
HEIGHT: 66 IN | HEART RATE: 92 BPM | OXYGEN SATURATION: 98 % | DIASTOLIC BLOOD PRESSURE: 96 MMHG | RESPIRATION RATE: 18 BRPM | BODY MASS INDEX: 40.66 KG/M2 | WEIGHT: 253 LBS | SYSTOLIC BLOOD PRESSURE: 142 MMHG

## 2023-03-09 VITALS — DIASTOLIC BLOOD PRESSURE: 78 MMHG | SYSTOLIC BLOOD PRESSURE: 122 MMHG

## 2023-03-09 DIAGNOSIS — Z87.898 PERSONAL HISTORY OF OTHER SPECIFIED CONDITIONS: ICD-10-CM

## 2023-03-09 PROCEDURE — 99205 OFFICE O/P NEW HI 60 MIN: CPT

## 2023-03-09 PROCEDURE — 76817 TRANSVAGINAL US OBSTETRIC: CPT

## 2023-03-09 PROCEDURE — 76811 OB US DETAILED SNGL FETUS: CPT | Mod: 59

## 2023-03-09 RX ORDER — VITAMIN C, CALCIUM, IRON, VITAMIN D3, VITAMIN E, VITAMIN B1, VITAMIN B2, VITAMIN B3, VITAMIN B6, FOLIC ACID, IODINE, ZINC, COPPER, DOCUSATE SODIUM, DOCOSAHEXAENOIC ACID (DHA) 27-1-50 MG
KIT ORAL
Refills: 0 | Status: ACTIVE | COMMUNITY

## 2023-03-09 NOTE — VITALS
[LMP (date): ___] : LMP was on [unfilled] [GA =___ Weeks] : which calculates to a GA of [unfilled] weeks [GA= ___ Days] : and [unfilled] day(s) [REAL by LMP (date): ___] : The calculated REAL by LMP is [unfilled] [By LMP] : this is the final REAL

## 2023-03-09 NOTE — DISCUSSION/SUMMARY
[FreeTextEntry1] : We had the pleasure of seeing your patient for a Maternal-Fetal Medicine consultation today. She is a 32 year-old  at 20 0/7 weeks of gestation with a pregnancy complicated by the below issues.\par \par She was extensively counseled regarding the following issues:\par \par Obesity, class 3: Obesity is associated with an increased risk for pregnancy complications such as preeclampsia. Complications from surgery are increased, as well as failure of regional anesthesia. In addition, the fetus is at increased risk for congenital anomalies, most commonly neural tube defects and cardiac anomalies, even in the absence of diabetes.  Malformations are more difficult to assess by ultrasound evaluation due to the decreased sensitivity of ultrasound in women with a high BMI. During pregnancy, optimum weight gain recommended by the [place] of Medicine 2009 Guidelines is 11-20 pounds. Furthermore, pregnant women with obesity are at a greater risk for venous thromboembolism. \par \par Anxiety/Depression; Medication Exposure (Sertraline): Anxiety and depression are common illnesses encountered in reproductive age women. Severe untreated anxiety and/or depression increases the risk for problems in pregnancy including noncompliance with prenatal care, substance abuse, poor appetite/weight gain, insomnia, worsening of mood disorders, suicidal ideation, lack of breastfeeding and mother-infant bonding. Treatment during pregnancy may be necessary for the health of the patient and the baby. For women who benefit from medication, the most commonly used class of drugs is SSRIs. Raina believes that she benefit from her current medication regimen and that her dose is effective. She reports that she meets weekly with a therapist and monthly with a psychiatrist. If the fetus is exposed in the third trimester, there are small risks after delivery which include poor  adjustment syndrome (agitation, restlessness, irritability) that rarely lasts beyond two weeks, and persistent pulmonary hypertension of the . She is at increased risk for postpartum depression. Her mood should be closely monitored.\par \par Possible mild chronic hypertension: She has had borderline blood pressure elevations on multiple visits. Women with pre-existing hypertension are at an increased risk for exacerbation of hypertension and super-imposed preeclampsia. It is recommended that women with chronic hypertension have baseline laboratory workup that includes: 24 hour urine protein collection or protein/creatinine ratio to establish a baseline of proteinuria, comprehensive metabolic profile (CMP) to evaluate renal and liver function, and complete blood count (CBC) to check platelets. Recent laboratory results were reviewed and within normal limits. The fetus is at an increased risk for growth restriction. Therefore, ultrasounds for growth are recommended every 4 weeks. Antepartum surveillance is recommended in the third trimester. Home blood pressure monitoring is recommended. We discussed that the goal of antihypertensive treatment in pregnancy is to keep blood pressures less than 140/90. I explained that she may need medication for blood pressures if they continue to be above 140/90. Continue low dose aspirin for preeclampsia risk reduction.\par \par Weekly fetal testing recommended at 32 weeks\par Delivery timing to be determined\par \par \par Thank you for requesting a consultation on this patient. The total time spent in preparation for this visit, medical history taking, orders, review of records, counseling the patient, and writing this note was 60 minutes.\par \par At the end of our discussion, the patient indicated that her questions were answered and she seemed satisfied with our discussion. Please do not hesitate to contact us with any questions.\par \par Sincerely,\par \par \par Castillo Sinha MD, SAVANNAH\par Attending Physician, Maternal-Fetal Medicine\par \par \par

## 2023-03-09 NOTE — CHIEF COMPLAINT
[G ___] : G [unfilled] [P ___] : P [unfilled] [de-identified] : history of pre diabetes, obesity, elevated BP in office, anxiety on medication

## 2023-03-09 NOTE — FAMILY HISTORY
[Age 35+ During Pregnancy] : not 35 or over during pregnancy [Reported Family History Of Birth Defects] : no congenital heart defects [Leobardo-Sachs Carrier] : no Leobardo-Sachs [Family History] : no mental retardation/autism [Reported Family History Of Genetic Disease] : no history of child defect in child of baby father

## 2023-03-09 NOTE — OB HISTORY
[Pregnancy History] : patient received anesthesia [___] : at [unfilled] weeks GA [LMP: ___] : LMP: [unfilled] [REAL: ___] : REAL: [unfilled] [EGA: ___ wks] : EGA: [unfilled] wks [Spontaneous] : Spontaneous conception [Sonogram] : sonogram [at ___ wks] : at [unfilled] weeks [FreeTextEntry1] : [facility] to discuss history of prediabetes, obesity, elevated BP and anxiety on medication. Pt had elevated A1C of 5.8% in 05/2021, most recent A1C on 12/15/22 was 5.6%= 114 mg/dL. Pt had 1 hr GCT on 2/21/23= 82. Pt has not seen dietary. Pt had elevated BP x1 in office- baseline labs attached, wnl. BP today was 142/96, repeat /78. Pt reports occasional headaches not relieved by tylenol. Pt is obese currently taking baby ASA alternating 1-2 tabs daily. NIPS-LR. Pt has anxiety currently taking sertraline 175mg once daily and sees a therpaist biweekly and psych monthly. Level 2 today. Patient reports good fetal movement, denies vaginal bleeding, leakage of fluid and cramping/ contractions.  [Definite:  ___ (Date)] : the last menstrual period was [unfilled] [Normal Amount/Duration] : was of a normal amount and duration [Spotting Between  Menses] : no spotting between menses [Regular Cycle Intervals] : periods have been regular [Menstrual Cramps] : no menstrual cramps [On BCP at conception] : the patient was not on BCP at conception

## 2023-03-20 ENCOUNTER — APPOINTMENT (OUTPATIENT)
Dept: OBGYN | Facility: CLINIC | Age: 33
End: 2023-03-20
Payer: COMMERCIAL

## 2023-03-20 VITALS
BODY MASS INDEX: 40.34 KG/M2 | HEIGHT: 66 IN | SYSTOLIC BLOOD PRESSURE: 114 MMHG | DIASTOLIC BLOOD PRESSURE: 68 MMHG | WEIGHT: 251 LBS

## 2023-03-20 PROCEDURE — 0502F SUBSEQUENT PRENATAL CARE: CPT

## 2023-03-28 LAB
BILIRUB UR QL STRIP: ABNORMAL
GLUCOSE UR-MCNC: NORMAL
HCG UR QL: 0.2 EU/DL
HGB UR QL STRIP.AUTO: NORMAL
KETONES UR-MCNC: ABNORMAL
LEUKOCYTE ESTERASE UR QL STRIP: NORMAL
NITRITE UR QL STRIP: NORMAL
PH UR STRIP: 6
PROT UR STRIP-MCNC: ABNORMAL
SP GR UR STRIP: 1.02

## 2023-03-30 ENCOUNTER — NON-APPOINTMENT (OUTPATIENT)
Age: 33
End: 2023-03-30

## 2023-03-30 ENCOUNTER — APPOINTMENT (OUTPATIENT)
Dept: OBGYN | Facility: CLINIC | Age: 33
End: 2023-03-30
Payer: COMMERCIAL

## 2023-03-30 VITALS
HEIGHT: 66 IN | BODY MASS INDEX: 40.38 KG/M2 | WEIGHT: 251.25 LBS | SYSTOLIC BLOOD PRESSURE: 116 MMHG | DIASTOLIC BLOOD PRESSURE: 70 MMHG

## 2023-03-30 DIAGNOSIS — N89.8 OTHER SPECIFIED NONINFLAMMATORY DISORDERS OF VAGINA: ICD-10-CM

## 2023-03-30 LAB
BILIRUB UR QL STRIP: ABNORMAL
GLUCOSE UR-MCNC: NORMAL
HCG UR QL: 0.2 EU/DL
HGB UR QL STRIP.AUTO: NORMAL
KETONES UR-MCNC: ABNORMAL
LEUKOCYTE ESTERASE UR QL STRIP: NORMAL
NITRITE UR QL STRIP: NORMAL
PH UR STRIP: 5.5
PROT UR STRIP-MCNC: NORMAL
SP GR UR STRIP: 1.02

## 2023-03-30 PROCEDURE — 81003 URINALYSIS AUTO W/O SCOPE: CPT | Mod: QW

## 2023-03-30 PROCEDURE — 99214 OFFICE O/P EST MOD 30 MIN: CPT | Mod: 25

## 2023-04-04 ENCOUNTER — NON-APPOINTMENT (OUTPATIENT)
Age: 33
End: 2023-04-04

## 2023-04-04 LAB
CANDIDA VAG CYTO: NOT DETECTED
G VAGINALIS+PREV SP MTYP VAG QL MICRO: DETECTED
T VAGINALIS VAG QL WET PREP: NOT DETECTED

## 2023-04-04 RX ORDER — METRONIDAZOLE 7.5 MG/G
0.75 GEL VAGINAL
Qty: 1 | Refills: 0 | Status: ACTIVE | COMMUNITY
Start: 2023-04-04

## 2023-04-18 ENCOUNTER — EMERGENCY (EMERGENCY)
Facility: HOSPITAL | Age: 33
LOS: 1 days | Discharge: HOME | End: 2023-04-18
Attending: EMERGENCY MEDICINE
Payer: COMMERCIAL

## 2023-04-18 VITALS
SYSTOLIC BLOOD PRESSURE: 115 MMHG | HEART RATE: 96 BPM | DIASTOLIC BLOOD PRESSURE: 70 MMHG | TEMPERATURE: 98 F | RESPIRATION RATE: 18 BRPM | OXYGEN SATURATION: 98 %

## 2023-04-18 PROCEDURE — 99284 EMERGENCY DEPT VISIT MOD MDM: CPT

## 2023-04-18 PROCEDURE — 93971 EXTREMITY STUDY: CPT

## 2023-04-18 PROCEDURE — 93971 EXTREMITY STUDY: CPT | Mod: 26,RT

## 2023-04-18 PROCEDURE — 99284 EMERGENCY DEPT VISIT MOD MDM: CPT | Mod: 25

## 2023-04-18 NOTE — ED PROVIDER NOTE - PHYSICAL EXAMINATION
Gen: No acute distress, non toxic  HEENT: Mucous membranes moist, pink conjunctivae, EOMI  CV: RRR, nl s1/s2.  Resp: CTAB, normal rate and effort  GI: Abdomen soft, non-tender  : No CVAT  Neuro: A&O x 3, moving all 4 extremities  MSK: No midline spine tenderness to palpation. Mild tenderness right lateral thigh. No calf tenderness. 5/5 strength BUE/BLE, sensation intact throughout, 2+ distal pulses  Skin: No rashes. intact and perfused.

## 2023-04-18 NOTE — ED PROVIDER NOTE - OBJECTIVE STATEMENT
33 y/o F presents c/o right leg pain. Pt currently 25 weeks pregnant. Yesterday while sitting at work developed lateral righ thigh pain, constant since onset tender to touch. Also with some tingling in foot. Denies leg swelling, back pain. No hx dvt or PE. notes recent flight 2 hrs each way. no falls/trauma. no abd pain.

## 2023-04-18 NOTE — ED PROVIDER NOTE - PATIENT PORTAL LINK FT
You can access the FollowMyHealth Patient Portal offered by [facility] by registering at the following website: [email or web address]. By joining [facility]’s FollowMyHealth portal, you will also be able to view your health information using other applications (apps) compatible with our system.

## 2023-04-18 NOTE — ED PROVIDER NOTE - CLINICAL SUMMARY MEDICAL DECISION MAKING FREE TEXT BOX
33 y/o F currently 25 weeks pregnant with RLE pain since yesterday atraumatic, some tingling in foot. no back pain. US RLE r/o DVT. declined any tylenol or analgesia 31 y/o F currently 25 weeks pregnant with RLE pain since yesterday atraumatic, some tingling in foot. no back pain. US RLE r/o DVT. declined any tylenol or analgesia  PEDRO Mclain: received sign out pending US. US negative for DVT. results d/w pt. advised to f/u with her OBGYN doctor

## 2023-04-18 NOTE — ED PROVIDER NOTE - NSFOLLOWUPINSTRUCTIONS_ED_ALL_ED_FT
- Return to the ED for any new or worsening symptoms.   - Follow up with your doctor within 2-3 days.

## 2023-04-18 NOTE — ED ADULT TRIAGE NOTE - CHIEF COMPLAINT QUOTE
Pt ambulated into ED c/o right leg pain. Reports that earlier today she developed a burning sensation to the thigh which eventually radiated to the foot. Pt denies trauma, denies any rash/redness to the area, and denies swelling of the calf/thigh. Reports that she is 25 weeks pregnant.

## 2023-04-19 ENCOUNTER — NON-APPOINTMENT (OUTPATIENT)
Age: 33
End: 2023-04-19

## 2023-04-20 ENCOUNTER — APPOINTMENT (OUTPATIENT)
Dept: OBGYN | Facility: CLINIC | Age: 33
End: 2023-04-20
Payer: COMMERCIAL

## 2023-04-20 ENCOUNTER — NON-APPOINTMENT (OUTPATIENT)
Age: 33
End: 2023-04-20

## 2023-04-20 VITALS — DIASTOLIC BLOOD PRESSURE: 65 MMHG | BODY MASS INDEX: 41 KG/M2 | WEIGHT: 254 LBS | SYSTOLIC BLOOD PRESSURE: 115 MMHG

## 2023-04-20 PROCEDURE — 0502F SUBSEQUENT PRENATAL CARE: CPT

## 2023-04-20 PROCEDURE — 36415 COLL VENOUS BLD VENIPUNCTURE: CPT

## 2023-04-21 ENCOUNTER — TRANSCRIPTION ENCOUNTER (OUTPATIENT)
Age: 33
End: 2023-04-21

## 2023-04-21 LAB
BASOPHILS # BLD AUTO: 0.04 K/UL
BASOPHILS NFR BLD AUTO: 0.4 %
BILIRUB UR QL STRIP: NORMAL
EOSINOPHIL # BLD AUTO: 0.39 K/UL
EOSINOPHIL NFR BLD AUTO: 4 %
GLUCOSE 1H P 50 G GLC PO SERPL-MCNC: 112 MG/DL
GLUCOSE UR-MCNC: NORMAL
HCG UR QL: 0.2 EU/DL
HCT VFR BLD CALC: 37.1 %
HGB BLD-MCNC: 11.8 G/DL
HGB UR QL STRIP.AUTO: NORMAL
IMM GRANULOCYTES NFR BLD AUTO: 0.4 %
KETONES UR-MCNC: NORMAL
LEUKOCYTE ESTERASE UR QL STRIP: NORMAL
LYMPHOCYTES # BLD AUTO: 2.15 K/UL
LYMPHOCYTES NFR BLD AUTO: 22.2 %
MAN DIFF?: NORMAL
MCHC RBC-ENTMCNC: 24.7 PG
MCHC RBC-ENTMCNC: 31.8 GM/DL
MCV RBC AUTO: 77.8 FL
MONOCYTES # BLD AUTO: 0.7 K/UL
MONOCYTES NFR BLD AUTO: 7.2 %
NEUTROPHILS # BLD AUTO: 6.36 K/UL
NEUTROPHILS NFR BLD AUTO: 65.8 %
NITRITE UR QL STRIP: NORMAL
PH UR STRIP: 6
PLATELET # BLD AUTO: 217 K/UL
PROT UR STRIP-MCNC: NORMAL
RBC # BLD: 4.77 M/UL
RBC # FLD: 15.4 %
SP GR UR STRIP: 1.02
WBC # FLD AUTO: 9.68 K/UL

## 2023-05-04 ENCOUNTER — APPOINTMENT (OUTPATIENT)
Dept: ANTEPARTUM | Facility: CLINIC | Age: 33
End: 2023-05-04
Payer: COMMERCIAL

## 2023-05-04 ENCOUNTER — ASOB RESULT (OUTPATIENT)
Age: 33
End: 2023-05-04

## 2023-05-04 PROCEDURE — 76816 OB US FOLLOW-UP PER FETUS: CPT

## 2023-05-12 ENCOUNTER — NON-APPOINTMENT (OUTPATIENT)
Age: 33
End: 2023-05-12

## 2023-05-12 ENCOUNTER — APPOINTMENT (OUTPATIENT)
Dept: OBGYN | Facility: CLINIC | Age: 33
End: 2023-05-12
Payer: COMMERCIAL

## 2023-05-12 VITALS
HEIGHT: 66 IN | WEIGHT: 255 LBS | SYSTOLIC BLOOD PRESSURE: 130 MMHG | BODY MASS INDEX: 40.98 KG/M2 | DIASTOLIC BLOOD PRESSURE: 70 MMHG

## 2023-05-12 PROCEDURE — 0502F SUBSEQUENT PRENATAL CARE: CPT

## 2023-05-16 LAB
BILIRUB UR QL STRIP: NORMAL
CLARITY UR: CLEAR
COLLECTION METHOD: NORMAL
GLUCOSE UR-MCNC: NORMAL
HCG UR QL: 0.2 EU/DL
HGB UR QL STRIP.AUTO: NORMAL
KETONES UR-MCNC: NORMAL
LEUKOCYTE ESTERASE UR QL STRIP: NORMAL
NITRITE UR QL STRIP: NORMAL
PH UR STRIP: 8.5
PROT UR STRIP-MCNC: ABNORMAL
SP GR UR STRIP: 1.01

## 2023-05-25 ENCOUNTER — NON-APPOINTMENT (OUTPATIENT)
Age: 33
End: 2023-05-25

## 2023-05-25 ENCOUNTER — APPOINTMENT (OUTPATIENT)
Dept: OBGYN | Facility: CLINIC | Age: 33
End: 2023-05-25
Payer: COMMERCIAL

## 2023-05-25 VITALS — BODY MASS INDEX: 41.32 KG/M2 | DIASTOLIC BLOOD PRESSURE: 68 MMHG | SYSTOLIC BLOOD PRESSURE: 120 MMHG | WEIGHT: 256 LBS

## 2023-05-25 DIAGNOSIS — Z23 ENCOUNTER FOR IMMUNIZATION: ICD-10-CM

## 2023-05-25 PROCEDURE — 0502F SUBSEQUENT PRENATAL CARE: CPT

## 2023-05-25 PROCEDURE — 90715 TDAP VACCINE 7 YRS/> IM: CPT

## 2023-05-25 PROCEDURE — 90471 IMMUNIZATION ADMIN: CPT

## 2023-05-30 LAB
BILIRUB UR QL STRIP: NORMAL
GLUCOSE UR-MCNC: NORMAL
HCG UR QL: 1 EU/DL
HGB UR QL STRIP.AUTO: NORMAL
KETONES UR-MCNC: ABNORMAL
LEUKOCYTE ESTERASE UR QL STRIP: NORMAL
NITRITE UR QL STRIP: NORMAL
PH UR STRIP: 6
PROT UR STRIP-MCNC: 30
SP GR UR STRIP: 1.03

## 2023-06-01 ENCOUNTER — APPOINTMENT (OUTPATIENT)
Dept: ANTEPARTUM | Facility: CLINIC | Age: 33
End: 2023-06-01
Payer: COMMERCIAL

## 2023-06-01 ENCOUNTER — ASOB RESULT (OUTPATIENT)
Age: 33
End: 2023-06-01

## 2023-06-01 PROCEDURE — 76816 OB US FOLLOW-UP PER FETUS: CPT

## 2023-06-08 ENCOUNTER — NON-APPOINTMENT (OUTPATIENT)
Age: 33
End: 2023-06-08

## 2023-06-08 ENCOUNTER — APPOINTMENT (OUTPATIENT)
Dept: OBGYN | Facility: CLINIC | Age: 33
End: 2023-06-08
Payer: COMMERCIAL

## 2023-06-08 ENCOUNTER — APPOINTMENT (OUTPATIENT)
Dept: ANTEPARTUM | Facility: CLINIC | Age: 33
End: 2023-06-08
Payer: COMMERCIAL

## 2023-06-08 ENCOUNTER — ASOB RESULT (OUTPATIENT)
Age: 33
End: 2023-06-08

## 2023-06-08 VITALS
WEIGHT: 254 LBS | HEIGHT: 66 IN | DIASTOLIC BLOOD PRESSURE: 84 MMHG | BODY MASS INDEX: 40.82 KG/M2 | SYSTOLIC BLOOD PRESSURE: 124 MMHG

## 2023-06-08 PROCEDURE — 0502F SUBSEQUENT PRENATAL CARE: CPT

## 2023-06-08 PROCEDURE — 76819 FETAL BIOPHYS PROFIL W/O NST: CPT

## 2023-06-08 PROCEDURE — 59025 FETAL NON-STRESS TEST: CPT

## 2023-06-09 ENCOUNTER — NON-APPOINTMENT (OUTPATIENT)
Age: 33
End: 2023-06-09

## 2023-06-13 LAB
BILIRUB UR QL STRIP: ABNORMAL
GLUCOSE UR-MCNC: NORMAL
HCG UR QL: 1 EU/DL
HGB UR QL STRIP.AUTO: NORMAL
KETONES UR-MCNC: ABNORMAL
LEUKOCYTE ESTERASE UR QL STRIP: ABNORMAL
NITRITE UR QL STRIP: NORMAL
PH UR STRIP: 6.5
PROT UR STRIP-MCNC: ABNORMAL
SP GR UR STRIP: 1.01

## 2023-06-15 ENCOUNTER — APPOINTMENT (OUTPATIENT)
Dept: OBGYN | Facility: CLINIC | Age: 33
End: 2023-06-15

## 2023-06-15 ENCOUNTER — APPOINTMENT (OUTPATIENT)
Dept: ANTEPARTUM | Facility: CLINIC | Age: 33
End: 2023-06-15

## 2023-06-22 ENCOUNTER — NON-APPOINTMENT (OUTPATIENT)
Age: 33
End: 2023-06-22

## 2023-06-23 ENCOUNTER — APPOINTMENT (OUTPATIENT)
Dept: OBGYN | Facility: CLINIC | Age: 33
End: 2023-06-23
Payer: COMMERCIAL

## 2023-06-23 VITALS
SYSTOLIC BLOOD PRESSURE: 124 MMHG | WEIGHT: 256 LBS | HEIGHT: 66 IN | BODY MASS INDEX: 41.14 KG/M2 | DIASTOLIC BLOOD PRESSURE: 70 MMHG

## 2023-06-23 PROCEDURE — 0502F SUBSEQUENT PRENATAL CARE: CPT

## 2023-06-29 ENCOUNTER — ASOB RESULT (OUTPATIENT)
Age: 33
End: 2023-06-29

## 2023-06-29 ENCOUNTER — APPOINTMENT (OUTPATIENT)
Dept: ANTEPARTUM | Facility: CLINIC | Age: 33
End: 2023-06-29
Payer: COMMERCIAL

## 2023-06-29 PROCEDURE — 76819 FETAL BIOPHYS PROFIL W/O NST: CPT | Mod: 59

## 2023-06-29 PROCEDURE — 76816 OB US FOLLOW-UP PER FETUS: CPT

## 2023-06-30 ENCOUNTER — NON-APPOINTMENT (OUTPATIENT)
Age: 33
End: 2023-06-30

## 2023-06-30 ENCOUNTER — APPOINTMENT (OUTPATIENT)
Dept: ANTEPARTUM | Facility: CLINIC | Age: 33
End: 2023-06-30

## 2023-06-30 ENCOUNTER — APPOINTMENT (OUTPATIENT)
Dept: OBGYN | Facility: CLINIC | Age: 33
End: 2023-06-30
Payer: COMMERCIAL

## 2023-06-30 VITALS
WEIGHT: 259.2 LBS | HEIGHT: 66 IN | SYSTOLIC BLOOD PRESSURE: 122 MMHG | DIASTOLIC BLOOD PRESSURE: 70 MMHG | BODY MASS INDEX: 41.66 KG/M2

## 2023-06-30 LAB
BILIRUB UR QL STRIP: NORMAL
GLUCOSE UR-MCNC: NORMAL
HCG UR QL: 0.2 EU/DL
HGB UR QL STRIP.AUTO: NORMAL
KETONES UR-MCNC: ABNORMAL
LEUKOCYTE ESTERASE UR QL STRIP: ABNORMAL
NITRITE UR QL STRIP: NORMAL
PH UR STRIP: 6
PROT UR STRIP-MCNC: ABNORMAL
SP GR UR STRIP: 1.03

## 2023-06-30 PROCEDURE — 59025 FETAL NON-STRESS TEST: CPT

## 2023-06-30 PROCEDURE — 36415 COLL VENOUS BLD VENIPUNCTURE: CPT

## 2023-06-30 PROCEDURE — 0502F SUBSEQUENT PRENATAL CARE: CPT

## 2023-07-01 LAB
HIV1+2 AB SPEC QL IA.RAPID: NONREACTIVE
T PALLIDUM AB SER QL IA: NEGATIVE

## 2023-07-03 LAB
CANDIDA VAG CYTO: NOT DETECTED
G VAGINALIS+PREV SP MTYP VAG QL MICRO: NOT DETECTED
T VAGINALIS VAG QL WET PREP: NOT DETECTED

## 2023-07-04 ENCOUNTER — NON-APPOINTMENT (OUTPATIENT)
Age: 33
End: 2023-07-04

## 2023-07-04 LAB
BILIRUB UR QL STRIP: ABNORMAL
GLUCOSE UR-MCNC: NORMAL
HCG UR QL: 1 EU/DL
HGB UR QL STRIP.AUTO: NORMAL
KETONES UR-MCNC: ABNORMAL
NITRITE UR QL STRIP: ABNORMAL
PH UR STRIP: 6.5
PROT UR STRIP-MCNC: ABNORMAL
SP GR UR STRIP: 1.02

## 2023-07-05 ENCOUNTER — NON-APPOINTMENT (OUTPATIENT)
Age: 33
End: 2023-07-05

## 2023-07-05 LAB — B-HEM STREP SPEC QL CULT: ABNORMAL

## 2023-07-06 ENCOUNTER — APPOINTMENT (OUTPATIENT)
Dept: OBGYN | Facility: CLINIC | Age: 33
End: 2023-07-06

## 2023-07-08 ENCOUNTER — APPOINTMENT (OUTPATIENT)
Dept: CARDIOLOGY | Facility: CLINIC | Age: 33
End: 2023-07-08
Payer: COMMERCIAL

## 2023-07-08 DIAGNOSIS — R03.0 ELEVATED BLOOD-PRESSURE READING, W/OUT DIAGNOSIS OF HYPERTENSION: ICD-10-CM

## 2023-07-08 DIAGNOSIS — R06.02 SHORTNESS OF BREATH: ICD-10-CM

## 2023-07-08 DIAGNOSIS — R00.2 PALPITATIONS: ICD-10-CM

## 2023-07-08 PROCEDURE — 93306 TTE W/DOPPLER COMPLETE: CPT

## 2023-07-10 ENCOUNTER — NON-APPOINTMENT (OUTPATIENT)
Age: 33
End: 2023-07-10

## 2023-07-10 ENCOUNTER — APPOINTMENT (OUTPATIENT)
Dept: CARDIOLOGY | Facility: CLINIC | Age: 33
End: 2023-07-10
Payer: COMMERCIAL

## 2023-07-10 VITALS
HEART RATE: 113 BPM | BODY MASS INDEX: 41.3 KG/M2 | DIASTOLIC BLOOD PRESSURE: 78 MMHG | HEIGHT: 66 IN | OXYGEN SATURATION: 97 % | TEMPERATURE: 98.9 F | SYSTOLIC BLOOD PRESSURE: 120 MMHG | WEIGHT: 257 LBS

## 2023-07-10 DIAGNOSIS — R00.0 TACHYCARDIA, UNSPECIFIED: ICD-10-CM

## 2023-07-10 PROCEDURE — 93000 ELECTROCARDIOGRAM COMPLETE: CPT

## 2023-07-10 PROCEDURE — 99204 OFFICE O/P NEW MOD 45 MIN: CPT | Mod: 25

## 2023-07-10 RX ORDER — ASPIRIN 81 MG
81 TABLET, DELAYED RELEASE (ENTERIC COATED) ORAL
Refills: 0 | Status: DISCONTINUED | COMMUNITY
End: 2023-07-10

## 2023-07-10 RX ORDER — SULFAMETHOXAZOLE AND TRIMETHOPRIM 800; 160 MG/1; MG/1
800-160 TABLET ORAL TWICE DAILY
Qty: 28 | Refills: 0 | Status: DISCONTINUED | COMMUNITY
Start: 2022-09-04 | End: 2023-07-10

## 2023-07-11 ENCOUNTER — APPOINTMENT (OUTPATIENT)
Dept: ANTEPARTUM | Facility: CLINIC | Age: 33
End: 2023-07-11
Payer: COMMERCIAL

## 2023-07-11 ENCOUNTER — NON-APPOINTMENT (OUTPATIENT)
Age: 33
End: 2023-07-11

## 2023-07-11 ENCOUNTER — APPOINTMENT (OUTPATIENT)
Dept: OBGYN | Facility: CLINIC | Age: 33
End: 2023-07-11
Payer: COMMERCIAL

## 2023-07-11 ENCOUNTER — ASOB RESULT (OUTPATIENT)
Age: 33
End: 2023-07-11

## 2023-07-11 VITALS
WEIGHT: 256 LBS | BODY MASS INDEX: 41.14 KG/M2 | SYSTOLIC BLOOD PRESSURE: 106 MMHG | HEIGHT: 66 IN | DIASTOLIC BLOOD PRESSURE: 68 MMHG

## 2023-07-11 LAB
BILIRUB UR QL STRIP: NORMAL
GLUCOSE UR-MCNC: NORMAL
HCG UR QL: 0.2 EU/DL
HGB UR QL STRIP.AUTO: NORMAL
KETONES UR-MCNC: NORMAL
LEUKOCYTE ESTERASE UR QL STRIP: ABNORMAL
NITRITE UR QL STRIP: NORMAL
PH UR STRIP: 7
PROT UR STRIP-MCNC: ABNORMAL
SP GR UR STRIP: 1.01

## 2023-07-11 PROCEDURE — 0502F SUBSEQUENT PRENATAL CARE: CPT

## 2023-07-11 PROCEDURE — 59025 FETAL NON-STRESS TEST: CPT

## 2023-07-11 PROCEDURE — 76819 FETAL BIOPHYS PROFIL W/O NST: CPT

## 2023-07-12 ENCOUNTER — NON-APPOINTMENT (OUTPATIENT)
Age: 33
End: 2023-07-12

## 2023-07-19 ENCOUNTER — NON-APPOINTMENT (OUTPATIENT)
Age: 33
End: 2023-07-19

## 2023-07-20 ENCOUNTER — APPOINTMENT (OUTPATIENT)
Dept: OBGYN | Facility: CLINIC | Age: 33
End: 2023-07-20
Payer: COMMERCIAL

## 2023-07-20 ENCOUNTER — APPOINTMENT (OUTPATIENT)
Dept: ANTEPARTUM | Facility: CLINIC | Age: 33
End: 2023-07-20
Payer: COMMERCIAL

## 2023-07-20 ENCOUNTER — ASOB RESULT (OUTPATIENT)
Age: 33
End: 2023-07-20

## 2023-07-20 VITALS
SYSTOLIC BLOOD PRESSURE: 120 MMHG | WEIGHT: 261 LBS | BODY MASS INDEX: 41.95 KG/M2 | HEIGHT: 66 IN | DIASTOLIC BLOOD PRESSURE: 74 MMHG

## 2023-07-20 PROCEDURE — 0502F SUBSEQUENT PRENATAL CARE: CPT

## 2023-07-20 PROCEDURE — 59025 FETAL NON-STRESS TEST: CPT

## 2023-07-20 PROCEDURE — 76819 FETAL BIOPHYS PROFIL W/O NST: CPT

## 2023-07-21 ENCOUNTER — APPOINTMENT (OUTPATIENT)
Dept: OBGYN | Facility: HOSPITAL | Age: 33
End: 2023-07-21

## 2023-07-21 ENCOUNTER — INPATIENT (INPATIENT)
Facility: HOSPITAL | Age: 33
LOS: 1 days | Discharge: HOME | End: 2023-07-23
Attending: STUDENT IN AN ORGANIZED HEALTH CARE EDUCATION/TRAINING PROGRAM | Admitting: STUDENT IN AN ORGANIZED HEALTH CARE EDUCATION/TRAINING PROGRAM
Payer: COMMERCIAL

## 2023-07-21 VITALS — HEART RATE: 88 BPM | SYSTOLIC BLOOD PRESSURE: 109 MMHG | DIASTOLIC BLOOD PRESSURE: 65 MMHG

## 2023-07-21 LAB
ANISOCYTOSIS BLD QL: SLIGHT — SIGNIFICANT CHANGE UP
BASOPHILS # BLD AUTO: 0 K/UL — SIGNIFICANT CHANGE UP (ref 0–0.2)
BASOPHILS NFR BLD AUTO: 0 % — SIGNIFICANT CHANGE UP (ref 0–2)
BLD GP AB SCN SERPL QL: SIGNIFICANT CHANGE UP
EOSINOPHIL # BLD AUTO: 0.49 K/UL — SIGNIFICANT CHANGE UP (ref 0–0.5)
EOSINOPHIL NFR BLD AUTO: 6.3 % — HIGH (ref 0–6)
GIANT PLATELETS BLD QL SMEAR: PRESENT — SIGNIFICANT CHANGE UP
HCT VFR BLD CALC: 34.5 % — SIGNIFICANT CHANGE UP (ref 34.5–45)
HGB BLD-MCNC: 10.8 G/DL — LOW (ref 11.5–15.5)
LYMPHOCYTES # BLD AUTO: 1.65 K/UL — SIGNIFICANT CHANGE UP (ref 1–3.3)
LYMPHOCYTES # BLD AUTO: 21.4 % — SIGNIFICANT CHANGE UP (ref 13–44)
MANUAL SMEAR VERIFICATION: SIGNIFICANT CHANGE UP
MCHC RBC-ENTMCNC: 23.2 PG — LOW (ref 27–34)
MCHC RBC-ENTMCNC: 31.3 GM/DL — LOW (ref 32–36)
MCV RBC AUTO: 74 FL — LOW (ref 80–100)
MICROCYTES BLD QL: SLIGHT — SIGNIFICANT CHANGE UP
MONOCYTES # BLD AUTO: 0.62 K/UL — SIGNIFICANT CHANGE UP (ref 0–0.9)
MONOCYTES NFR BLD AUTO: 8 % — SIGNIFICANT CHANGE UP (ref 2–14)
NEUTROPHILS # BLD AUTO: 4.96 K/UL — SIGNIFICANT CHANGE UP (ref 1.8–7.4)
NEUTROPHILS NFR BLD AUTO: 64.3 % — SIGNIFICANT CHANGE UP (ref 43–77)
NRBC # BLD: 2 /100 — HIGH (ref 0–0)
PLAT MORPH BLD: NORMAL — SIGNIFICANT CHANGE UP
PLATELET # BLD AUTO: 163 K/UL — SIGNIFICANT CHANGE UP (ref 150–400)
POIKILOCYTOSIS BLD QL AUTO: SLIGHT — SIGNIFICANT CHANGE UP
POLYCHROMASIA BLD QL SMEAR: SLIGHT — SIGNIFICANT CHANGE UP
RBC # BLD: 4.66 M/UL — SIGNIFICANT CHANGE UP (ref 3.8–5.2)
RBC # FLD: 15.5 % — HIGH (ref 10.3–14.5)
RBC BLD AUTO: ABNORMAL
SMUDGE CELLS # BLD: PRESENT — SIGNIFICANT CHANGE UP
WBC # BLD: 7.71 K/UL — SIGNIFICANT CHANGE UP (ref 3.8–10.5)
WBC # FLD AUTO: 7.71 K/UL — SIGNIFICANT CHANGE UP (ref 3.8–10.5)

## 2023-07-21 RX ORDER — CHLORHEXIDINE GLUCONATE 213 G/1000ML
1 SOLUTION TOPICAL DAILY
Refills: 0 | Status: DISCONTINUED | OUTPATIENT
Start: 2023-07-21 | End: 2023-07-22

## 2023-07-21 RX ORDER — OXYTOCIN 10 UNIT/ML
333.33 VIAL (ML) INJECTION
Qty: 20 | Refills: 0 | Status: COMPLETED | OUTPATIENT
Start: 2023-07-21 | End: 2023-07-21

## 2023-07-21 RX ORDER — OXYTOCIN 10 UNIT/ML
VIAL (ML) INJECTION
Qty: 30 | Refills: 0 | Status: DISCONTINUED | OUTPATIENT
Start: 2023-07-21 | End: 2023-07-22

## 2023-07-21 RX ORDER — CITRIC ACID/SODIUM CITRATE 300-500 MG
15 SOLUTION, ORAL ORAL EVERY 6 HOURS
Refills: 0 | Status: DISCONTINUED | OUTPATIENT
Start: 2023-07-21 | End: 2023-07-21

## 2023-07-21 RX ORDER — AMPICILLIN TRIHYDRATE 250 MG
1 CAPSULE ORAL EVERY 4 HOURS
Refills: 0 | Status: DISCONTINUED | OUTPATIENT
Start: 2023-07-21 | End: 2023-07-22

## 2023-07-21 RX ORDER — SODIUM CHLORIDE 9 MG/ML
1000 INJECTION, SOLUTION INTRAVENOUS
Refills: 0 | Status: DISCONTINUED | OUTPATIENT
Start: 2023-07-21 | End: 2023-07-22

## 2023-07-21 RX ORDER — AMPICILLIN TRIHYDRATE 250 MG
2 CAPSULE ORAL ONCE
Refills: 0 | Status: COMPLETED | OUTPATIENT
Start: 2023-07-21 | End: 2023-07-21

## 2023-07-21 RX ORDER — CITRIC ACID/SODIUM CITRATE 300-500 MG
30 SOLUTION, ORAL ORAL ONCE
Refills: 0 | Status: DISCONTINUED | OUTPATIENT
Start: 2023-07-21 | End: 2023-07-23

## 2023-07-21 RX ADMIN — Medication 108 GRAM(S): at 21:39

## 2023-07-21 RX ADMIN — SODIUM CHLORIDE 125 MILLILITER(S): 9 INJECTION, SOLUTION INTRAVENOUS at 16:25

## 2023-07-21 RX ADMIN — Medication 200 GRAM(S): at 17:37

## 2023-07-21 RX ADMIN — Medication 2 MILLIUNIT(S)/MIN: at 18:14

## 2023-07-21 NOTE — OB PROVIDER H&P - NSHPREVIEWOFSYSTEMS_GEN_ALL_CORE
Gen: -fever, chills, weakness, fatigue  Eyes/ENT: -visual changes, vertigo, throat pain  Neck: -pain, stiffness  Pulm: -cough, wheeze, hemoptysis, +shortness of breath  CV: -chest pain, palpitations   GI: -appetite change, N/V/D/C, abdominal or epigastric pain  : -urgency, discharge, blood in urine, urinary complaints, burning with voiding  MSK: -swelling  Neuro: -headache, weakness  Derm: -rash, itching, burning, lesions, hair loss

## 2023-07-21 NOTE — OB PROVIDER IHI INDUCTION/AUGMENTATION NOTE - NS_OBIHIATTENDATTEST_OBGYN_ALL_OB
2051: RN paged on call provider to renew restraint orders.    2159: Provider entered new orders for restraints, and patient tolerated well.   I have reviewed the history and the physical examination of the patient, as well as the assessment and plan, as was entered by the resident physician or the mid-level provider. I agree with the plan to induce or augment labor, and in my opinion, at this time, the use of Pitocin, and/or other induction agent(s), is safe and beneficial to mother and fetus.

## 2023-07-21 NOTE — OB PROVIDER H&P - HISTORY OF PRESENT ILLNESS
GOLD WEST is a well-appearing 33y  at 39 weeks GA by LMP consistent with trimester sono who presents to L&D for induction.    REAL: 2023  LMP: Oct. 2022    Prenatal course is significant for:  Prenatal course uncomplicated.      Patient denies vaginal bleeding, contractions and leakage of fluid. She endorses good fetal movement, about 10 times an hour. Denies fevers, chills, nausea and vomiting.  Patient reports shortness of breath on exertion which has worsened with the pregnancy.   Patient denies suicidal ideation and self-harm.     POB:   PGYN: -fibroids, -ovarian cysts, had chlamydia in , denies abnormal PAPs   PMH: Anxiety and Depression sees a therapist and is on sertraline  PSH: Denies  SH: Denies EtOH, tobacco and illicit drug use during this pregnancy; feels safe at home   Meds: PNVs, Sertraline 150 mg, Omeprazole 20 mg, Zyrtec OTC, Vitamin D  Allergies: NKDA, Seasonal Allergies     BMI:   Sono:  EFW:        T(C): --  HR: 88 (23 @ 15:02) (88 - 88)  BP: 109/65 (23 @ 15:02) (109/65 - 109/65)  RR: --  SpO2: --  Gen: NAD, well-appearing   Abd: Soft, gravid  Ext: non-tender, non-edematous  SVE:    Bedside sono:  FHT:  [place]:  GOLD WEST is a well-appearing 33y  at 39 weeks GA by LMP consistent with trimester sono who presents to L&D for induction.    REAL: 2023  LMP: Oct. 2022    Prenatal course is significant for:  Prenatal course uncomplicated.      Patient denies vaginal bleeding, contractions and leakage of fluid. She endorses good fetal movement, about 10 times an hour. Denies fevers, chills, nausea and vomiting.  Patient reports shortness of breath on exertion which has worsened with the pregnancy.   Patient denies suicidal ideation and self-harm.     POB: NSVDx1  PGYN: -fibroids, -ovarian cysts, had chlamydia in , denies abnormal PAPs   PMH: Anxiety and Depression sees a therapist and is on sertraline  PSH: Denies  SH: Denies EtOH, tobacco and illicit drug use during this pregnancy; feels safe at home   Meds: PNVs, Sertraline 150 mg, Omeprazole 20 mg, Zyrtec OTC, Vitamin D  Allergies: NKDA, Seasonal Allergies     BMI: 43.4      Vital Signs Last 24 Hrs  HR: 88 (2023 16:19) (88 - 88)  BP: 109/65 (2023 16:19) (109/65 - 109/65)  RR: 18 (2023 16:19) (18 - 18)    Gen: NAD, well-appearing   Abd: Soft, gravid  Ext: non-tender, non-edematous  SVE:  2/50/-3    FHT: baseline 140, moderate variability, +accels, -decels  [place]: infrequent CTX GOLD WEST is a well-appearing 33y  at 39 weeks GA by LMP consistent with trimester sono who presents to L&D for elective induction. Patient denies vaginal bleeding, contractions and leakage of fluid. She endorses good fetal movement, about 10 times an hour. Denies fevers, chills, nausea and vomiting.  Patient reports shortness of breath on exertion which has worsened with the pregnancy. Patient denies suicidal ideation and self-harm.   REAL: 2023  LMP: Oct. 2022    Prenatal course is significant for:  Obesity  Otherwise uncomplicated    POB: NSVDx1  PGYN: -fibroids, -ovarian cysts, had chlamydia in , denies abnormal PAPs   PMH: Anxiety and Depression sees a therapist and is on sertraline  PSH: Denies  SH: Denies EtOH, tobacco and illicit drug use during this pregnancy; feels safe at home   Meds: PNVs, Sertraline 150 mg, Omeprazole 20 mg, Zyrtec OTC, Vitamin D  Allergies: NKDA, Seasonal Allergies     BMI: 43.4  Vital Signs Last 24 Hrs  HR: 88 (2023 16:19) (88 - 88)  BP: 109/65 (2023 16:19) (109/65 - 109/65)  RR: 18 (2023 16:19) (18 - 18)    Gen: NAD, well-appearing   Abd: Soft, gravid  Ext: non-tender, non-edematous  SVE:  2/50/-3    FHT: baseline 140, moderate variability, +accels, -decels  [place]: infrequent CTX

## 2023-07-21 NOTE — OB PROVIDER H&P - NSHPSOCIALHISTORY_GEN_ALL_CORE
Patient lives at home with daughter in [place]. Feels safe at home, has access to food, and health insurance.   Patient reports no guns in the home.

## 2023-07-21 NOTE — OB PROVIDER H&P - ASSESSMENT
A/P:   -Admit to L&D  -Consent  -Admission labs  -NPO, except ice chips   -IV fluids  -Labor: Intact/*ROM. Latent/Active labor. [place] *.   -Fetus: Cat I tracing. Continuous toco and fetal monitoring.   -GBS: Positive, no GBS ppx required   -Analgesia:   -DVT ppx: Ambulate and SCD's while in bed     Discussed with Dr. Frey   A/P:   -Admit to L&D for induction of labor  -Consent  -Admission labs  -NPO, except ice chips   -IV fluids  -Start pitocin  -Labor: Intact.   -Fetus: Cat I tracing. Continuous toco and fetal monitoring.   -GBS: Positive, Ampicillin ordered  -DVT ppx: Ambulate and SCD's while in bed     Discussed with Dr. Frey   A/P:   -Admit to L&D for induction of labor  -Consent  -Admission labs  -NPO, except ice chips   -IV fluids  -Start pitocin  -Labor: Intact.   -Fetus: Cat I tracing. Continuous toco and fetal monitoring.   -GBS: Positive, Ampicillin ordered  -DVT ppx: Ambulate and SCD's while in bed     seen and examined with with Dr. Frey   A/P: GOLD WEST is a well-appearing 33y  at 39 weeks GA by LMP consistent with trimester sono who presents to L&D for elective induction.   -Admit to L&D  -Consent  -Admission labs  -IV fluids  -Start pitocin  -Labor: Intact.   -Fetus: Cat I tracing. Continuous toco and fetal monitoring.   -GBS: Positive, Ampicillin ordered  -DVT ppx: Ambulate and SCD's while in bed     seen and examined with Dr. Frey

## 2023-07-21 NOTE — OB PROVIDER H&P - ATTENDING COMMENTS
term IOL with favorable cervix. pit and arom. epi PRN  desires sterilization, will perform tomorrow if stable  Nimo Dodson

## 2023-07-21 NOTE — OB PROVIDER H&P - NSLOWPPHRISK_OBGYN_A_OB
No previous uterine incision/Zamarripa Pregnancy/Less than or equal to 4 previous vaginal births/No known bleeding disorder/No history of postpartum hemorrhage/No other PPH risks indicated

## 2023-07-22 LAB — T PALLIDUM AB TITR SER: NEGATIVE — SIGNIFICANT CHANGE UP

## 2023-07-22 PROCEDURE — 59400 OBSTETRICAL CARE: CPT

## 2023-07-22 RX ORDER — SIMETHICONE 80 MG/1
80 TABLET, CHEWABLE ORAL EVERY 4 HOURS
Refills: 0 | Status: DISCONTINUED | OUTPATIENT
Start: 2023-07-22 | End: 2023-07-23

## 2023-07-22 RX ORDER — OXYCODONE HYDROCHLORIDE 5 MG/1
5 TABLET ORAL ONCE
Refills: 0 | Status: DISCONTINUED | OUTPATIENT
Start: 2023-07-22 | End: 2023-07-23

## 2023-07-22 RX ORDER — LANOLIN
1 OINTMENT (GRAM) TOPICAL EVERY 6 HOURS
Refills: 0 | Status: DISCONTINUED | OUTPATIENT
Start: 2023-07-22 | End: 2023-07-23

## 2023-07-22 RX ORDER — BENZOCAINE 10 %
1 GEL (GRAM) MUCOUS MEMBRANE EVERY 6 HOURS
Refills: 0 | Status: DISCONTINUED | OUTPATIENT
Start: 2023-07-22 | End: 2023-07-23

## 2023-07-22 RX ORDER — OXYCODONE HYDROCHLORIDE 5 MG/1
5 TABLET ORAL
Refills: 0 | Status: DISCONTINUED | OUTPATIENT
Start: 2023-07-22 | End: 2023-07-23

## 2023-07-22 RX ORDER — KETOROLAC TROMETHAMINE 30 MG/ML
30 SYRINGE (ML) INJECTION ONCE
Refills: 0 | Status: DISCONTINUED | OUTPATIENT
Start: 2023-07-22 | End: 2023-07-22

## 2023-07-22 RX ORDER — IBUPROFEN 200 MG
600 TABLET ORAL EVERY 6 HOURS
Refills: 0 | Status: COMPLETED | OUTPATIENT
Start: 2023-07-22 | End: 2024-06-19

## 2023-07-22 RX ORDER — DIPHENHYDRAMINE HCL 50 MG
25 CAPSULE ORAL EVERY 6 HOURS
Refills: 0 | Status: DISCONTINUED | OUTPATIENT
Start: 2023-07-22 | End: 2023-07-23

## 2023-07-22 RX ORDER — IBUPROFEN 200 MG
600 TABLET ORAL EVERY 6 HOURS
Refills: 0 | Status: DISCONTINUED | OUTPATIENT
Start: 2023-07-22 | End: 2023-07-23

## 2023-07-22 RX ORDER — DIBUCAINE 1 %
1 OINTMENT (GRAM) RECTAL EVERY 6 HOURS
Refills: 0 | Status: DISCONTINUED | OUTPATIENT
Start: 2023-07-22 | End: 2023-07-23

## 2023-07-22 RX ORDER — OXYTOCIN 10 UNIT/ML
41.67 VIAL (ML) INJECTION
Qty: 20 | Refills: 0 | Status: DISCONTINUED | OUTPATIENT
Start: 2023-07-22 | End: 2023-07-23

## 2023-07-22 RX ORDER — MAGNESIUM HYDROXIDE 400 MG/1
30 TABLET, CHEWABLE ORAL
Refills: 0 | Status: DISCONTINUED | OUTPATIENT
Start: 2023-07-22 | End: 2023-07-23

## 2023-07-22 RX ORDER — ACETAMINOPHEN 500 MG
975 TABLET ORAL
Refills: 0 | Status: DISCONTINUED | OUTPATIENT
Start: 2023-07-22 | End: 2023-07-23

## 2023-07-22 RX ORDER — SODIUM CHLORIDE 9 MG/ML
3 INJECTION INTRAMUSCULAR; INTRAVENOUS; SUBCUTANEOUS EVERY 8 HOURS
Refills: 0 | Status: DISCONTINUED | OUTPATIENT
Start: 2023-07-22 | End: 2023-07-23

## 2023-07-22 RX ORDER — AER TRAVELER 0.5 G/1
1 SOLUTION RECTAL; TOPICAL EVERY 4 HOURS
Refills: 0 | Status: DISCONTINUED | OUTPATIENT
Start: 2023-07-22 | End: 2023-07-23

## 2023-07-22 RX ORDER — TETANUS TOXOID, REDUCED DIPHTHERIA TOXOID AND ACELLULAR PERTUSSIS VACCINE, ADSORBED 5; 2.5; 8; 8; 2.5 [IU]/.5ML; [IU]/.5ML; UG/.5ML; UG/.5ML; UG/.5ML
0.5 SUSPENSION INTRAMUSCULAR ONCE
Refills: 0 | Status: DISCONTINUED | OUTPATIENT
Start: 2023-07-22 | End: 2023-07-23

## 2023-07-22 RX ORDER — HYDROCORTISONE 1 %
1 OINTMENT (GRAM) TOPICAL EVERY 6 HOURS
Refills: 0 | Status: DISCONTINUED | OUTPATIENT
Start: 2023-07-22 | End: 2023-07-23

## 2023-07-22 RX ORDER — PRAMOXINE HYDROCHLORIDE 150 MG/15G
1 AEROSOL, FOAM RECTAL EVERY 4 HOURS
Refills: 0 | Status: DISCONTINUED | OUTPATIENT
Start: 2023-07-22 | End: 2023-07-23

## 2023-07-22 RX ADMIN — Medication 975 MILLIGRAM(S): at 14:35

## 2023-07-22 RX ADMIN — Medication 600 MILLIGRAM(S): at 17:47

## 2023-07-22 RX ADMIN — Medication 975 MILLIGRAM(S): at 20:16

## 2023-07-22 RX ADMIN — Medication 975 MILLIGRAM(S): at 15:30

## 2023-07-22 RX ADMIN — Medication 975 MILLIGRAM(S): at 20:18

## 2023-07-22 RX ADMIN — Medication 30 MILLIGRAM(S): at 04:25

## 2023-07-22 RX ADMIN — Medication 108 GRAM(S): at 01:26

## 2023-07-22 RX ADMIN — Medication 30 MILLIGRAM(S): at 04:13

## 2023-07-22 RX ADMIN — Medication 1000 MILLIUNIT(S)/MIN: at 02:47

## 2023-07-22 RX ADMIN — Medication 1 TABLET(S): at 11:56

## 2023-07-22 RX ADMIN — Medication 600 MILLIGRAM(S): at 18:53

## 2023-07-22 NOTE — OB RN DELIVERY SUMMARY - NS_SEPSISRSKCALC_OBGYN_ALL_OB_FT
EOS calculated successfully. EOS Risk Factor: 0.5/1000 live births ([facility] national incidence); GA=39w1d; Temp=98.1; ROM=6.7; GBS='Positive'; Antibiotics='No antibiotics or any antibiotics < 2 hrs prior to birth'

## 2023-07-22 NOTE — OB PROVIDER DELIVERY SUMMARY - NSPROVIDERDELIVERYNOTE_OBGYN_ALL_OB_FT
37.2 Patient felt rectal pressure and was found to be fully dilated, 0 station. She pushed effectively for 10 minutes, and delivered a viable male infant at 0247. His name is Madi!  Vertex delivered without difficulty, no nuchal cord noted, and shoulders then delivered without difficulty.  Delayed cord clamping for approximately 30 seconds, and skin to skin was initiated. Cord segment was clamped and cut for cord blood collection. Placenta delivered spontaneously and intact at 0254. Pitocin started. Uterus, cervix, perineum and vagina were inspected and a 1st deg lac was noted and repaired with 2-0 vicryl.  Excellent hemostasis was achieved. Apgars 9 & 9, EBL 300cc.

## 2023-07-22 NOTE — OB PROVIDER DELIVERY SUMMARY - NSSELHIDDEN_OBGYN_ALL_OB_FT
[NS_DeliveryAttending1_OBGYN_ALL_OB_FT:[id number]=],[NS_DeliveryAssist1_OBGYN_ALL_OB_FT:[id number]=]

## 2023-07-22 NOTE — OB RN DELIVERY SUMMARY - NSSELHIDDEN_OBGYN_ALL_OB_FT
[NS_DeliveryAttending1_OBGYN_ALL_OB_FT:[id number]=],[NS_DeliveryAssist1_OBGYN_ALL_OB_FT:[id number]=],[NS_DeliveryRN_OBGYN_ALL_OB_FT:[id number]=]

## 2023-07-23 ENCOUNTER — TRANSCRIPTION ENCOUNTER (OUTPATIENT)
Age: 33
End: 2023-07-23

## 2023-07-23 VITALS
OXYGEN SATURATION: 98 % | RESPIRATION RATE: 18 BRPM | DIASTOLIC BLOOD PRESSURE: 69 MMHG | HEART RATE: 83 BPM | SYSTOLIC BLOOD PRESSURE: 102 MMHG | TEMPERATURE: 98 F

## 2023-07-23 LAB
HCT VFR BLD CALC: 32.8 % — LOW (ref 34.5–45)
HGB BLD-MCNC: 10.2 G/DL — LOW (ref 11.5–15.5)

## 2023-07-23 PROCEDURE — 85014 HEMATOCRIT: CPT

## 2023-07-23 PROCEDURE — 86780 TREPONEMA PALLIDUM: CPT

## 2023-07-23 PROCEDURE — 86900 BLOOD TYPING SEROLOGIC ABO: CPT

## 2023-07-23 PROCEDURE — 85025 COMPLETE CBC W/AUTO DIFF WBC: CPT

## 2023-07-23 PROCEDURE — 85018 HEMOGLOBIN: CPT

## 2023-07-23 PROCEDURE — 36415 COLL VENOUS BLD VENIPUNCTURE: CPT

## 2023-07-23 PROCEDURE — 86901 BLOOD TYPING SEROLOGIC RH(D): CPT

## 2023-07-23 PROCEDURE — 86850 RBC ANTIBODY SCREEN: CPT

## 2023-07-23 RX ADMIN — Medication 975 MILLIGRAM(S): at 08:52

## 2023-07-23 RX ADMIN — Medication 1 TABLET(S): at 11:34

## 2023-07-23 RX ADMIN — Medication 600 MILLIGRAM(S): at 12:18

## 2023-07-23 RX ADMIN — Medication 600 MILLIGRAM(S): at 11:35

## 2023-07-23 RX ADMIN — Medication 975 MILLIGRAM(S): at 09:50

## 2023-07-23 NOTE — DISCHARGE NOTE OB - NS MD DC FALL RISK RISK
For information on Fall & Injury Prevention, visit: [email or web address] OR  [email or web address] OR  https://www.cdc.gov/steadi/patient.html

## 2023-07-23 NOTE — DISCHARGE NOTE OB - PATIENT PORTAL LINK FT
You can access the FollowMyHealth Patient Portal offered by [facility] by registering at the following website: [email or web address]. By joining [facility]’s FollowMyHealth portal, you will also be able to view your health information using other applications (apps) compatible with our system.

## 2023-07-23 NOTE — PROGRESS NOTE ADULT - SUBJECTIVE AND OBJECTIVE BOX
GOLD WEST is a 33y  now PPD#1 s/p spontaneous vaginal delivery at 39 weeks gestation, uncomplicated.    S:    The patient has no complaints. Patient states that she no longer wants to get her tubal.  Pain controlled with current treatment regimen.   She is ambulating without difficulty and tolerating PO.   + flatus/-BM/+ voiding   She endorses appropriate lochia, which is decreasing.   She is breastfeeding without difficulty.   Denies headache, chest pain, shortness of breath, leg swelling    O:    T(C): 36.6 (23 @ 03:27), Max: 36.8 (23 @ 05:30)  HR: 83 (23 @ 03:27) (83 - 94)  BP: 102/69 (23 @ 03:27) (102/67 - 115/73)  RR: 18 (23 @ 03:27) (16 - 18)  SpO2: 98% (23 @ 03:27) (97% - 98%)    Gen: NAD, AOx3  Breast: Nontender, non-engorged   Abdomen:  Soft, non-tender, non-distended  Uterus:  Fundus firm below umbilicus  VE:  +Lochia  Ext:  Non-tender and non-edematous                          10.8   7.71  )-----------( 163      ( 2023 16:49 )             34.5              GODL WEST is a 33y  now PPD#1 s/p spontaneous vaginal delivery at 39 weeks gestation, uncomplicated.    S:    The patient has no complaints. Patient states that she no longer wants to get her tubal.  Pain controlled with current treatment regimen.   She is ambulating without difficulty and tolerating PO.   + flatus/-BM/+ voiding   She endorses appropriate lochia, which is decreasing.   Patient is bottlefeeding.   Denies headache, chest pain, shortness of breath, leg swelling    O:    T(C): 36.6 (23 @ 03:27), Max: 36.8 (23 @ 05:30)  HR: 83 (23 @ 03:27) (83 - 94)  BP: 102/69 (23 @ 03:27) (102/67 - 115/73)  RR: 18 (23 @ 03:27) (16 - 18)  SpO2: 98% (23 @ 03:27) (97% - 98%)    Gen: NAD, AOx3  Breast: Nontender, non-engorged   Abdomen:  Soft, non-tender, non-distended  Uterus:  Fundus firm below umbilicus  VE:  +Lochia  Ext:  Non-tender and non-edematous                          10.8   7.71  )-----------( 163      ( 2023 16:49 )             34.5

## 2023-07-23 NOTE — DISCHARGE NOTE OB - NSDCQMERRANDS_GEN_ALL_CORE
[facility]  1961  HOME: [phone]   WORK: N/A   CELL: [phone]       Patient calling with questions/issues in regards to the following medication/s: stopping ProAir for COPD and starting a medication with letters of b o o n a. The letters may not be in that order. He saw the commercial on TV. Patient having the following symptoms/issues: Wants to try something better  Pharmacy:[facility] [address] in [facility]. Please call patient at [phone]. No

## 2023-07-23 NOTE — DISCHARGE NOTE OB - CARE PROVIDER_API CALL
Nimo Dodson  Obstetrics and Gynecology  [address] [facility], [address] [address]  Phone: [phone]  Fax: [phone]  Follow Up Time:

## 2023-07-23 NOTE — DISCHARGE NOTE OB - MEDICATION SUMMARY - MEDICATIONS TO TAKE
I will START or STAY ON the medications listed below when I get home from the hospital:    ibuprofen 600 mg oral tablet  -- 1 tab(s) by mouth every 6 hours, As needed, Moderate Pain  -- Indication: For pain    Prena1 oral capsule  -- 1 cap(s) by mouth once a day  -- Indication: For vitamin

## 2023-07-23 NOTE — DISCHARGE NOTE OB - CARE PLAN
1 Principal Discharge DX:	Vaginal delivery  Assessment and plan of treatment:	return to normal state of health. follow up in the office in 2 weeks for depression screening.

## 2023-07-23 NOTE — PROGRESS NOTE ADULT - ASSESSMENT
A/P:  GOLD WEST is a 33y  now PPD#1 s/p spontaneous vaginal delivery at 39 weeks gestation, uncomplicated.  -Vital signs stable  -Hgb: 10.8 -> AM labs pending   -Voiding, tolerating PO, bowel function nml   -Advance care as tolerated   -Continue routine postpartum care and education  -Healthy male infant at bedside, desires circumcision  -Dispo: Continue to monitor patient

## 2023-07-24 ENCOUNTER — NON-APPOINTMENT (OUTPATIENT)
Age: 33
End: 2023-07-24

## 2023-08-07 ENCOUNTER — APPOINTMENT (OUTPATIENT)
Dept: OBGYN | Facility: CLINIC | Age: 33
End: 2023-08-07
Payer: COMMERCIAL

## 2023-08-07 VITALS — SYSTOLIC BLOOD PRESSURE: 120 MMHG | DIASTOLIC BLOOD PRESSURE: 76 MMHG

## 2023-08-07 DIAGNOSIS — O26.899 OTHER SPECIFIED PREGNANCY RELATED CONDITIONS, UNSPECIFIED TRIMESTER: ICD-10-CM

## 2023-08-07 DIAGNOSIS — N89.8 OTHER SPECIFIED PREGNANCY RELATED CONDITIONS, UNSPECIFIED TRIMESTER: ICD-10-CM

## 2023-08-07 DIAGNOSIS — Z34.92 ENCOUNTER FOR SUPERVISION OF NORMAL PREGNANCY, UNSPECIFIED, SECOND TRIMESTER: ICD-10-CM

## 2023-08-07 DIAGNOSIS — O34.60: ICD-10-CM

## 2023-08-07 DIAGNOSIS — O99.340 OTHER MENTAL DISORDERS COMPLICATING PREGNANCY, UNSPECIFIED TRIMESTER: ICD-10-CM

## 2023-08-07 DIAGNOSIS — F41.9 OTHER MENTAL DISORDERS COMPLICATING PREGNANCY, UNSPECIFIED TRIMESTER: ICD-10-CM

## 2023-08-07 DIAGNOSIS — Z34.93 ENCOUNTER FOR SUPERVISION OF NORMAL PREGNANCY, UNSPECIFIED, THIRD TRIMESTER: ICD-10-CM

## 2023-08-07 DIAGNOSIS — B95.1 STREPTOCOCCUS, GROUP B, AS THE CAUSE OF DISEASES CLASSIFIED ELSEWHERE: ICD-10-CM

## 2023-08-07 DIAGNOSIS — R30.0 DYSURIA: ICD-10-CM

## 2023-08-07 DIAGNOSIS — Z3A.20 20 WEEKS GESTATION OF PREGNANCY: ICD-10-CM

## 2023-08-07 DIAGNOSIS — Z30.2 ENCOUNTER FOR STERILIZATION: ICD-10-CM

## 2023-08-07 DIAGNOSIS — N39.0 URINARY TRACT INFECTION, SITE NOT SPECIFIED: ICD-10-CM

## 2023-08-07 DIAGNOSIS — O99.212 OBESITY COMPLICATING PREGNANCY, SECOND TRIMESTER: ICD-10-CM

## 2023-08-07 LAB
BILIRUB UR QL STRIP: NORMAL
GLUCOSE UR-MCNC: NORMAL
HCG UR QL: 1 EU/DL
HGB UR QL STRIP.AUTO: ABNORMAL
KETONES UR-MCNC: ABNORMAL
LEUKOCYTE ESTERASE UR QL STRIP: ABNORMAL
NITRITE UR QL STRIP: NORMAL
PH UR STRIP: 6.5
PROT UR STRIP-MCNC: ABNORMAL
SP GR UR STRIP: 1.02

## 2023-08-07 PROCEDURE — 81003 URINALYSIS AUTO W/O SCOPE: CPT | Mod: QW

## 2023-08-07 PROCEDURE — 99213 OFFICE O/P EST LOW 20 MIN: CPT | Mod: 24,25

## 2023-08-07 PROCEDURE — 0503F POSTPARTUM CARE VISIT: CPT

## 2023-08-07 RX ORDER — NITROFURANTOIN (MONOHYDRATE/MACROCRYSTALS) 25; 75 MG/1; MG/1
100 CAPSULE ORAL TWICE DAILY
Qty: 10 | Refills: 0 | Status: ACTIVE | COMMUNITY
Start: 2023-08-07 | End: 1900-01-01

## 2023-08-07 NOTE — HISTORY OF PRESENT ILLNESS
[Postpartum Follow Up] : postpartum follow up [Last Pap Date: ___] : Last Pap Date: [unfilled] [Delivery Date: ___] : on [unfilled] [] : delivered by vaginal delivery [Male] : Delivery History: baby boy [Wt. ___] : weighing [unfilled] [Breastfeeding] : currently nursing [BTL] : no tubal ligation [BF with Difficulty] : nursing without difficulty [Resumed Menses] : has not resumed her menses [Resumed [place]] : has not resumed intercourse [Intended Contraception] : Intended Contraception: [Tubal Ligation] : tubal ligation [BreastFeeding Problems] : no breastfeeding problems [S/Sx PP Depression] : no signs/symptoms of postpartum depression [Heavy Bleeding] : no heavy bleeding [Irregular Bleeding] : no irregular bleeding [Suicidal Ideation] : no suicidal ideation [Chills] : no chills [Fatigue] : no fatigue [Dysuria] : dysuria [Fever] : no fever [Headache] : no headache [Nausea] : no nausea [Vomiting] : no vomiting [None] : no vaginal bleeding [Normal] : the vagina was normal [Hematoma] : no vaginal hematoma [Abscess Formation] : no vaginal abscess [Not Done] : Examination of breasts not done [Doing Well] : is doing well [Excellent Pain Control] : has excellent pain control [de-identified] : sharp pain with urinating, did not improve with azo [de-identified] : periurethral area and anterior vagina appear healthy [de-identified] : likely uti, urine culture sent and macrobid given [de-identified] : rto 4 weeks for sterilization consult

## 2023-08-14 LAB — BACTERIA UR CULT: NORMAL

## 2023-08-21 PROBLEM — R06.02 SHORTNESS OF BREATH ON EXERTION: Status: ACTIVE | Noted: 2023-06-08

## 2023-08-21 PROBLEM — R03.0 ELEVATED BP WITHOUT DIAGNOSIS OF HYPERTENSION: Status: ACTIVE | Noted: 2023-03-09

## 2023-08-21 PROBLEM — R00.2 HEART PALPITATIONS: Status: ACTIVE | Noted: 2023-07-10

## 2023-08-21 NOTE — REASON FOR VISIT
[Symptom and Test Evaluation] : symptom and test evaluation [FreeTextEntry1] : 34 y/o F with G3P 1001 presents for cardiac eval and tachycardia in pregnancy  Past Pregnancies:  #1 (2016): Vaginal delivery of 8lb 13oz girl, at 41 weeks GA . patient received anesthesia. Delivery occurred at [facility]. no pregnancy complications reported.  #2 (2021): at 8 weeks GA. Pregnancy #2 Comments: TOP with D&C.   complaints of tachycardia, palpitation with dizziness or lightheadedness; started 2 months ago, consistent no episodes of passing out  someimes at rest sitting a ceratain, no uppn exertion  did not check thyroid  [email or web address] in [facility]  1 month pain dvt   family: mother: CAD s/p PCI on medication sees the cardiologist  Sertraline omperazole

## 2023-08-21 NOTE — ASSESSMENT
[FreeTextEntry1] : 34 y/o F with G3P 1001 presents for cardiac eval and tachycardia in pregnancy   1) Tachycardia in pregnancy  - this is likely due to underlying pathology  - EKG done shows sinus tachycardia with no acute changes associated with ischemia  - no syncope or passing out  - will refer for Holter monitor and if symptomatic post partum will refer for further cardiac testing   Deyanira Thornton D.O. [facility] Cardiology/Vascular Cardiology -[facility] Telephone # [phone]

## 2023-09-05 ENCOUNTER — APPOINTMENT (OUTPATIENT)
Dept: FAMILY MEDICINE | Facility: CLINIC | Age: 33
End: 2023-09-05

## 2023-09-05 ENCOUNTER — APPOINTMENT (OUTPATIENT)
Dept: OBGYN | Facility: CLINIC | Age: 33
End: 2023-09-05

## 2023-09-05 ENCOUNTER — APPOINTMENT (OUTPATIENT)
Dept: CARDIOLOGY | Facility: CLINIC | Age: 33
End: 2023-09-05

## 2023-09-22 ENCOUNTER — APPOINTMENT (OUTPATIENT)
Dept: OBGYN | Facility: HOSPITAL | Age: 33
End: 2023-09-22

## 2023-10-02 ENCOUNTER — APPOINTMENT (OUTPATIENT)
Dept: OBGYN | Facility: CLINIC | Age: 33
End: 2023-10-02

## 2023-11-03 ENCOUNTER — APPOINTMENT (OUTPATIENT)
Dept: OBGYN | Facility: HOSPITAL | Age: 33
End: 2023-11-03

## 2023-11-12 NOTE — PHYSICAL EXAM
[No Acute Distress] : no acute distress [Normal Voice/Communication] : normal voice/communication [Normal Sclera/Conjunctiva] : normal sclera/conjunctiva [PERRL] : pupils equal round and reactive to light [No JVD] : no jugular venous distention [Normal Oropharynx] : the oropharynx was normal [No Lymphadenopathy] : no lymphadenopathy [Supple] : supple [No Respiratory Distress] : no respiratory distress  [No Accessory Muscle Use] : no accessory muscle use [Clear to Auscultation] : lungs were clear to auscultation bilaterally [Normal Rate] : normal rate  [Regular Rhythm] : with a regular rhythm [Normal S1, S2] : normal S1 and S2 [No Murmur] : no murmur heard [No Edema] : there was no peripheral edema [Normal Affect] : the affect was normal [No Rash] : no rash [Alert and Oriented x3] : oriented to person, place, and time [Normal Insight/Judgement] : insight and judgment were intact [Normal Mood] : the mood was normal [Soft] : abdomen soft [Non Tender] : non-tender [Non-distended] : non-distended [No Masses] : no abdominal mass palpated [No HSM] : no HSM [Normal Bowel Sounds] : normal bowel sounds [No Spinal Tenderness] : no spinal tenderness [No Skin Lesions] : no skin lesions [No Focal Deficits] : no focal deficits no

## 2023-11-14 ENCOUNTER — APPOINTMENT (OUTPATIENT)
Dept: OBGYN | Facility: CLINIC | Age: 33
End: 2023-11-14

## 2023-11-15 ENCOUNTER — APPOINTMENT (OUTPATIENT)
Dept: INTERNAL MEDICINE | Facility: CLINIC | Age: 33
End: 2023-11-15
Payer: COMMERCIAL

## 2023-11-15 VITALS
DIASTOLIC BLOOD PRESSURE: 63 MMHG | TEMPERATURE: 98.1 F | OXYGEN SATURATION: 95 % | WEIGHT: 264 LBS | BODY MASS INDEX: 42.43 KG/M2 | HEART RATE: 90 BPM | SYSTOLIC BLOOD PRESSURE: 121 MMHG | HEIGHT: 66 IN | RESPIRATION RATE: 18 BRPM

## 2023-11-15 DIAGNOSIS — B34.9 VIRAL INFECTION, UNSPECIFIED: ICD-10-CM

## 2023-11-15 PROCEDURE — 99212 OFFICE O/P EST SF 10 MIN: CPT

## 2023-11-16 ENCOUNTER — TRANSCRIPTION ENCOUNTER (OUTPATIENT)
Age: 33
End: 2023-11-16

## 2023-11-16 LAB
INFLUENZA A RESULT: NOT DETECTED
INFLUENZA B RESULT: NOT DETECTED
RESP SYN VIRUS RESULT: NOT DETECTED
SARS-COV-2 RESULT: NOT DETECTED

## 2024-01-02 ENCOUNTER — APPOINTMENT (OUTPATIENT)
Dept: FAMILY MEDICINE | Facility: CLINIC | Age: 34
End: 2024-01-02

## 2024-01-17 ENCOUNTER — APPOINTMENT (OUTPATIENT)
Dept: FAMILY MEDICINE | Facility: CLINIC | Age: 34
End: 2024-01-17

## 2024-01-19 ENCOUNTER — APPOINTMENT (OUTPATIENT)
Dept: INTERNAL MEDICINE | Facility: CLINIC | Age: 34
End: 2024-01-19

## 2024-02-02 ENCOUNTER — APPOINTMENT (OUTPATIENT)
Dept: OBGYN | Facility: HOSPITAL | Age: 34
End: 2024-02-02

## 2024-02-13 ENCOUNTER — APPOINTMENT (OUTPATIENT)
Dept: OBGYN | Facility: CLINIC | Age: 34
End: 2024-02-13

## 2024-02-14 ENCOUNTER — APPOINTMENT (OUTPATIENT)
Dept: INTERNAL MEDICINE | Facility: CLINIC | Age: 34
End: 2024-02-14

## 2024-05-03 ENCOUNTER — APPOINTMENT (OUTPATIENT)
Dept: OBGYN | Facility: CLINIC | Age: 34
End: 2024-05-03

## 2024-06-13 NOTE — ED ADULT NURSE NOTE - DOES PATIENT HAVE ADVANCE DIRECTIVE
Could you please call the patient and relay the following:     Lung function tests look much better.   You may discontinue the Trelegy all together.   If you notice any changes in your breathing after discontinuation of the medication please contact the office.  No

## 2024-08-16 NOTE — ED ADULT NURSE NOTE - OBJECTIVE STATEMENT
65
c/o R thigh pain radiating down R leg. 25 weeks pregnant. states she was on a flight last week. denies trauma. denies SOB